# Patient Record
Sex: FEMALE | Race: WHITE | HISPANIC OR LATINO | Employment: UNEMPLOYED | URBAN - METROPOLITAN AREA
[De-identification: names, ages, dates, MRNs, and addresses within clinical notes are randomized per-mention and may not be internally consistent; named-entity substitution may affect disease eponyms.]

---
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
## 2020-01-03 ENCOUNTER — GYNECOLOGY VISIT (OUTPATIENT)
Dept: OBGYN | Facility: CLINIC | Age: 20
End: 2020-01-03
Payer: COMMERCIAL

## 2020-01-03 VITALS — WEIGHT: 179 LBS | DIASTOLIC BLOOD PRESSURE: 65 MMHG | SYSTOLIC BLOOD PRESSURE: 124 MMHG

## 2020-01-03 DIAGNOSIS — Z30.09 ENCOUNTER FOR COUNSELING REGARDING CONTRACEPTION: ICD-10-CM

## 2020-01-03 PROCEDURE — 99203 OFFICE O/P NEW LOW 30 MIN: CPT | Performed by: OBSTETRICS & GYNECOLOGY

## 2020-01-03 RX ORDER — FLUOXETINE HYDROCHLORIDE 20 MG/1
60 CAPSULE ORAL DAILY
COMMUNITY

## 2020-01-03 RX ORDER — DOXEPIN HYDROCHLORIDE 50 MG/1
50 CAPSULE ORAL NIGHTLY
COMMUNITY
End: 2020-05-16

## 2020-01-03 RX ORDER — MISOPROSTOL 200 UG/1
200 TABLET ORAL ONCE
Qty: 1 TAB | Refills: 0 | Status: SHIPPED | OUTPATIENT
Start: 2020-01-03 | End: 2020-01-03

## 2020-01-03 RX ORDER — CARBAMAZEPINE 200 MG/1
400 TABLET ORAL 3 TIMES DAILY
COMMUNITY

## 2020-01-03 NOTE — PROGRESS NOTES
Chief Complaint   Patient presents with   • Gynecologic Exam       History of present illness: 19 y.o.  presents with above chief complaint. Pt is interested in birth control, specifically an IUD.  She is currently using condoms only.  Birth control methods used in the past are condoms only. She had an attempted placement with WICC but was told she needs dilation and that they don't have dilators there.     PGYNHx: menarche 12yo , very irregular every 1-4 months, lasting 1-5 days,  Last pap never, no hx STDs. Sexually active with male partner    Psych history: diagnosed with MDD, social and generalized anxiety,     Review of systems:  Pertinent positives documented in HPI and all other systems reviewed & are negative    All PMH, PSH, allergies, social history and FH reviewed and updated today:  Past Medical History:   Diagnosis Date   • Anxiety    • Depression        History reviewed. No pertinent surgical history.    Allergies: Not on File    Social History     Socioeconomic History   • Marital status: Single     Spouse name: Not on file   • Number of children: Not on file   • Years of education: Not on file   • Highest education level: Not on file   Occupational History   • Not on file   Social Needs   • Financial resource strain: Not on file   • Food insecurity:     Worry: Not on file     Inability: Not on file   • Transportation needs:     Medical: Not on file     Non-medical: Not on file   Tobacco Use   • Smoking status: Never Smoker   • Smokeless tobacco: Never Used   Substance and Sexual Activity   • Alcohol use: Yes     Comment: socially    • Drug use: Yes     Frequency: 1.0 times per week     Types: Marijuana     Comment: once a week    • Sexual activity: Yes     Partners: Male     Birth control/protection: Condom   Lifestyle   • Physical activity:     Days per week: Not on file     Minutes per session: Not on file   • Stress: Not on file   Relationships   • Social connections:     Talks on phone:  Not on file     Gets together: Not on file     Attends [employer] service: Not on file     Active member of club or organization: Not on file     Attends meetings of clubs or organizations: Not on file     Relationship status: Not on file   • Intimate partner violence:     Fear of current or ex partner: Not on file     Emotionally abused: Not on file     Physically abused: Not on file     Forced sexual activity: Not on file   Other Topics Concern   • Not on file   Social History Narrative   • Not on file       Family History   Problem Relation Age of Onset   • Lupus Mother    • No Known Problems Father    • No Known Problems Brother        Physical exam:  /65   Wt 81.2 kg (179 lb)     GENERAL APPEARANCE: healthy, alert  NECK nontender, no masses, thyromegaly or nodules  NEURO Awake, alert and oriented x 3, Normal gait, no sensory deficits  SKIN No rashes, or ulcers or lesions seen  PSYCHIATRIC: Patient shows appropriate affect, is alert and oriented x3, intact judgment and insight.    Assessment/Plan:  1. Encounter for counseling regarding contraception       Counseling/coordination of care of birth control options including medical and surgical methods. Discussed in detail risk and benefits of OCP, NuvaRing, Patch, Depo, IUDs, Nexplanon as well as the normal side effects of each. Questions answered. Patient desires IUD for birth control and orders placed. Pt is to use condoms or pelvic rest until placement of product.  Advised she would be given cytotec 200mcg per vagina the night before the procedure.   Most recently had pap smear with WICC so will obtain records.

## 2020-01-03 NOTE — NON-PROVIDER
Pt here for BC consultation   Pt states has really irregular periods, had annual at [facility] on [address]. Had appointment to get IUD placed. IUD was not placed due to provider at [facility] not being able to insert.   Pt would like IUD, would like to discuss signing paperwork and going through with it.   Good# [phone]  Pharmacy verified

## 2020-03-25 ENCOUNTER — GYNECOLOGY VISIT (OUTPATIENT)
Dept: OBGYN | Facility: CLINIC | Age: 20
End: 2020-03-25
Payer: COMMERCIAL

## 2020-03-25 VITALS — WEIGHT: 166 LBS | DIASTOLIC BLOOD PRESSURE: 63 MMHG | SYSTOLIC BLOOD PRESSURE: 104 MMHG

## 2020-03-25 DIAGNOSIS — Z30.09 ENCOUNTER FOR COUNSELING REGARDING CONTRACEPTION: ICD-10-CM

## 2020-03-25 PROCEDURE — 58300 INSERT INTRAUTERINE DEVICE: CPT | Performed by: OBSTETRICS & GYNECOLOGY

## 2020-03-25 ASSESSMENT — FIBROSIS 4 INDEX: FIB4 SCORE: 0.26

## 2020-03-25 NOTE — PROCEDURES
IUD Insertion  Date/Time: 3/25/2020 9:33 AM  Performed by: Gabby Rendon D.O.  Authorized by: Gabby Rendon D.O.     Consent:     Consent obtained:  Verbal and written    Procedure risks and benefits discussed: yes      Patient questions answered: yes      Patient agrees, verbalizes understanding, and wants to proceed: yes      Instructions and paperwork completed: yes    Pre-procedure details:     Negative urine pregnancy test: yes    Procedure:     Pelvic exam performed: yes      Sterile speculum placed in vagina: yes      Cervix visualized: yes      Cervix cleaned and prepped in sterile fashion: yes      Tenaculum applied to cervix: yes      Dilation needed: no      Uterus sounded: yes      Uterus sound depth (cm):  7    IUD inserted with no complications: yes      IUD type:  Mirena    Strings trimmed: yes    Post-procedure:     Patient tolerated procedure well: yes      Patient will follow up after next period: yes

## 2020-05-04 ENCOUNTER — HOSPITAL ENCOUNTER (OUTPATIENT)
Dept: LAB | Facility: MEDICAL CENTER | Age: 20
End: 2020-05-04
Attending: NURSE PRACTITIONER
Payer: COMMERCIAL

## 2020-05-04 LAB
ALBUMIN SERPL BCP-MCNC: 4.8 G/DL (ref 3.2–4.9)
ALBUMIN/GLOB SERPL: 1.4 G/DL
ALP SERPL-CCNC: 91 U/L (ref 30–99)
ALT SERPL-CCNC: 11 U/L (ref 2–50)
ANION GAP SERPL CALC-SCNC: 14 MMOL/L (ref 7–16)
APPEARANCE UR: CLEAR
AST SERPL-CCNC: 12 U/L (ref 12–45)
BASOPHILS # BLD AUTO: 0.2 % (ref 0–1.8)
BASOPHILS # BLD: 0.01 K/UL (ref 0–0.12)
BILIRUB SERPL-MCNC: 0.4 MG/DL (ref 0.1–1.5)
BILIRUB UR QL STRIP.AUTO: NEGATIVE
BUN SERPL-MCNC: 10 MG/DL (ref 8–22)
CALCIUM SERPL-MCNC: 9.9 MG/DL (ref 8.5–10.5)
CHLORIDE SERPL-SCNC: 99 MMOL/L (ref 96–112)
CO2 SERPL-SCNC: 21 MMOL/L (ref 20–33)
COLOR UR: YELLOW
CREAT SERPL-MCNC: 0.53 MG/DL (ref 0.5–1.4)
EOSINOPHIL # BLD AUTO: 0.02 K/UL (ref 0–0.51)
EOSINOPHIL NFR BLD: 0.3 % (ref 0–6.9)
ERYTHROCYTE [DISTWIDTH] IN BLOOD BY AUTOMATED COUNT: 48.5 FL (ref 35.9–50)
FASTING STATUS PATIENT QL REPORTED: NORMAL
GLOBULIN SER CALC-MCNC: 3.4 G/DL (ref 1.9–3.5)
GLUCOSE SERPL-MCNC: 77 MG/DL (ref 65–99)
GLUCOSE UR STRIP.AUTO-MCNC: NEGATIVE MG/DL
HCT VFR BLD AUTO: 44.1 % (ref 37–47)
HGB BLD-MCNC: 14.5 G/DL (ref 12–16)
IMM GRANULOCYTES # BLD AUTO: 0.01 K/UL (ref 0–0.11)
IMM GRANULOCYTES NFR BLD AUTO: 0.2 % (ref 0–0.9)
KETONES UR STRIP.AUTO-MCNC: 15 MG/DL
LEUKOCYTE ESTERASE UR QL STRIP.AUTO: NEGATIVE
LYMPHOCYTES # BLD AUTO: 1.43 K/UL (ref 1–4.8)
LYMPHOCYTES NFR BLD: 23.7 % (ref 22–41)
MCH RBC QN AUTO: 30.8 PG (ref 27–33)
MCHC RBC AUTO-ENTMCNC: 32.9 G/DL (ref 33.6–35)
MCV RBC AUTO: 93.6 FL (ref 81.4–97.8)
MICRO URNS: ABNORMAL
MONOCYTES # BLD AUTO: 0.41 K/UL (ref 0–0.85)
MONOCYTES NFR BLD AUTO: 6.8 % (ref 0–13.4)
NEUTROPHILS # BLD AUTO: 4.15 K/UL (ref 2–7.15)
NEUTROPHILS NFR BLD: 68.8 % (ref 44–72)
NITRITE UR QL STRIP.AUTO: NEGATIVE
NRBC # BLD AUTO: 0 K/UL
NRBC BLD-RTO: 0 /100 WBC
PH UR STRIP.AUTO: 6 [PH] (ref 5–8)
PLATELET # BLD AUTO: 243 K/UL (ref 164–446)
PMV BLD AUTO: 10.4 FL (ref 9–12.9)
POTASSIUM SERPL-SCNC: 4.5 MMOL/L (ref 3.6–5.5)
PROT SERPL-MCNC: 8.2 G/DL (ref 6–8.2)
PROT UR QL STRIP: NEGATIVE MG/DL
RBC # BLD AUTO: 4.71 M/UL (ref 4.2–5.4)
RBC UR QL AUTO: NEGATIVE
SODIUM SERPL-SCNC: 134 MMOL/L (ref 135–145)
SP GR UR STRIP.AUTO: 1.03
TSH SERPL DL<=0.005 MIU/L-ACNC: 1.08 UIU/ML (ref 0.38–5.33)
UROBILINOGEN UR STRIP.AUTO-MCNC: 0.2 MG/DL
WBC # BLD AUTO: 6 K/UL (ref 4.8–10.8)

## 2020-05-04 PROCEDURE — 85025 COMPLETE CBC W/AUTO DIFF WBC: CPT

## 2020-05-04 PROCEDURE — 80053 COMPREHEN METABOLIC PANEL: CPT

## 2020-05-04 PROCEDURE — 84443 ASSAY THYROID STIM HORMONE: CPT

## 2020-05-04 PROCEDURE — 81003 URINALYSIS AUTO W/O SCOPE: CPT

## 2020-05-04 PROCEDURE — 36415 COLL VENOUS BLD VENIPUNCTURE: CPT

## 2020-05-16 ENCOUNTER — HOSPITAL ENCOUNTER (EMERGENCY)
Facility: MEDICAL CENTER | Age: 20
End: 2020-05-16
Attending: EMERGENCY MEDICINE
Payer: COMMERCIAL

## 2020-05-16 VITALS
WEIGHT: 161.6 LBS | TEMPERATURE: 98.4 F | DIASTOLIC BLOOD PRESSURE: 74 MMHG | BODY MASS INDEX: 28.63 KG/M2 | SYSTOLIC BLOOD PRESSURE: 108 MMHG | HEIGHT: 63 IN | RESPIRATION RATE: 16 BRPM | OXYGEN SATURATION: 98 % | HEART RATE: 88 BPM

## 2020-05-16 DIAGNOSIS — T50.902A INTENTIONAL DRUG OVERDOSE, INITIAL ENCOUNTER (HCC): ICD-10-CM

## 2020-05-16 DIAGNOSIS — R45.851 SUICIDAL IDEATION: ICD-10-CM

## 2020-05-16 LAB
APAP SERPL-MCNC: <5 UG/ML (ref 10–30)
CARBAMAZEPINE SERPL-MCNC: 10 UG/ML (ref 4–12)
CARBAMAZEPINE SERPL-MCNC: 8 UG/ML (ref 4–12)
EKG IMPRESSION: NORMAL
ETHANOL BLD-MCNC: <10.1 MG/DL (ref 0–10.1)
SALICYLATES SERPL-MCNC: <1 MG/DL (ref 15–25)

## 2020-05-16 PROCEDURE — 90471 IMMUNIZATION ADMIN: CPT

## 2020-05-16 PROCEDURE — 99285 EMERGENCY DEPT VISIT HI MDM: CPT

## 2020-05-16 PROCEDURE — 700111 HCHG RX REV CODE 636 W/ 250 OVERRIDE (IP): Performed by: EMERGENCY MEDICINE

## 2020-05-16 PROCEDURE — 99284 EMERGENCY DEPT VISIT MOD MDM: CPT | Performed by: PSYCHIATRY & NEUROLOGY

## 2020-05-16 PROCEDURE — 80156 ASSAY CARBAMAZEPINE TOTAL: CPT | Mod: 91

## 2020-05-16 PROCEDURE — 700105 HCHG RX REV CODE 258: Performed by: EMERGENCY MEDICINE

## 2020-05-16 PROCEDURE — 80307 DRUG TEST PRSMV CHEM ANLYZR: CPT

## 2020-05-16 PROCEDURE — 93005 ELECTROCARDIOGRAM TRACING: CPT | Performed by: EMERGENCY MEDICINE

## 2020-05-16 PROCEDURE — 90715 TDAP VACCINE 7 YRS/> IM: CPT | Performed by: EMERGENCY MEDICINE

## 2020-05-16 RX ORDER — TRAZODONE HYDROCHLORIDE 50 MG/1
25 TABLET ORAL NIGHTLY PRN
COMMUNITY

## 2020-05-16 RX ORDER — SODIUM CHLORIDE 9 MG/ML
1000 INJECTION, SOLUTION INTRAVENOUS ONCE
Status: COMPLETED | OUTPATIENT
Start: 2020-05-16 | End: 2020-05-16

## 2020-05-16 RX ADMIN — SODIUM CHLORIDE 1000 ML: 9 INJECTION, SOLUTION INTRAVENOUS at 03:15

## 2020-05-16 RX ADMIN — CLOSTRIDIUM TETANI TOXOID ANTIGEN (FORMALDEHYDE INACTIVATED), CORYNEBACTERIUM DIPHTHERIAE TOXOID ANTIGEN (FORMALDEHYDE INACTIVATED), BORDETELLA PERTUSSIS TOXOID ANTIGEN (GLUTARALDEHYDE INACTIVATED), BORDETELLA PERTUSSIS FILAMENTOUS HEMAGGLUTININ ANTIGEN (FORMALDEHYDE INACTIVATED), BORDETELLA PERTUSSIS PERTACTIN ANTIGEN, AND BORDETELLA PERTUSSIS FIMBRIAE 2/3 ANTIGEN 0.5 ML: 5; 2; 2.5; 5; 3; 5 INJECTION, SUSPENSION INTRAMUSCULAR at 10:26

## 2020-05-16 ASSESSMENT — FIBROSIS 4 INDEX: FIB4 SCORE: 0.28

## 2020-05-16 NOTE — ED NOTES
Pt alert and oriented after awoken up.  Denies needs at this time.  Does not need to urinate at this time but possibly soon.

## 2020-05-16 NOTE — PSYCHIATRY
"PSYCHIATRIC INTAKE EVALUATION    *Reason for admission: reports she was in her bedroom and felt an impulse to take 300mg of benadryl at around 0030. She reports that this was not an attempt to end her life and tried to make herself vomit with no success. She reports she is having auditory hallucinations. Pt is alert and awake at this time. She called poison control and was informed to come to the ED. She has also taken 400 mg tegratol at 0030.                 *Reason for consult:     overdose   *Requesting Physician/APN:  HERMINIA Coker MD         Legal Hold status:    +        *Chief Complaint:   \"I don't know why I did it\"    *HPI (includes Psychiatric ROS):  18 yo female who, on impulse, felt an \"urge\" to overdose. She is not aware of it being a SA and isn't SI now. She told her mom what she had done. She had some confusion thinking she was talking to a friend when she wasn't, yesterday, but these are gone today.     Pt had been at [facility] a few days for depression. She was \"scammed\" about 2 weeks ago and this brought on this episode of depression. Without trazodone she doesn't sleep, her appetite has been down, decreased energy, mild anhedonia, intermittent hopelessness. No SI.     Alexus: a few days here and there of increased energy with cleaning and working out, \"overly motivated\".     PTSD: none, denies trauma  Anxiety/Panic: provoked by situations.   Psychosis: denies except as noted.     Psychotherapy: face to face for 37 min in addition to E/M: discussed borderline dx, coping skills, need for further therapy, self worth, knowing oneself, emptiness and so on.       *Medical Review Of Symptoms (not dx conditions):   All 10 symptoms reviewed and are negative except for  -Musculoskeletal: generalized achyness but denies temp. Improved today.  -Psychiatric: depression    *Psychiatric Examination:   Vitals: Blood pressure 108/74, pulse 88, temperature 36.9 °C (98.4 °F), temperature source Temporal, resp. rate 16, height 1.6 " "m (5' 3\"), weight 73.3 kg (161 lb 9.6 oz), last menstrual period 05/15/2020, SpO2 98 %.  General Appearance: normal habitus, good eye contact, clean. Cooperative.  Abnormal Movements:none  Gait and Posture: lying in bed and moves freely  Speech:wnl  Thought Process: normal rate  Associations:linear  Abnormal or Psychotic Thoughts:none  Judgement and Insight:improved  Orientation:grossly intact  Recent and Remote Memory:grossly intact  Attention Span and Concentration:intact  Language:not tested  Fund of Knowledge:not tested  Mood and Affect: depressed but improved. Affect looks euthymic  SI/HI: denies       *PAST MEDICAL/PSYCH/FAMILY/SOCIAL(as reported by patient):       *medical hx:          Past Medical History:   Diagnosis Date   • Anxiety    • Depression      History reviewed. No pertinent surgical history.     *psychiatric hx:   SAs: overdosed on Ibuprofen years ago after and upset with a relationship. She also cuts when \"pent up\" because it makes the emotions go away.  Guns: not that she knows of  Hospitalizations: last year at [facility]: a broken relationship, SI, called hotline.   Med Hx:celexa, doxepin. Currently on prozac just increased to 80 mg, trazodone for sleep and tegretol.   Dx Hx: \"borderline traits, anxiety and depression\".   Other: no abuse. No OCD, no PTSD.    *family Psych hx:  Depression on both sides. No known hx of SAs. Drinking on both sides but \"not alcoholics\"     *social hx:  Alcohol: socially  Drugs: THC but began having what sounds like cyclic vomiting. Now only uses 1-2 times a week. Feels better on it.     *MEDICAL HX: labs, MARS, medications, etc were reviewed. Only those findings of potential interest to psychiatry are noted below:    *Current Medical issues:  see below      *Allergies:  No Known Allergies  *Current Medications:  No current facility-administered medications for this encounter.     Current Outpatient Medications:   •  traZODone (DESYREL) 50 MG Tab, Take 25 mg by mouth at " bedtime as needed for Sleep., Disp: , Rfl:   •  carBAMazepine (TEGRETOL) 200 MG Tab, Take 400 mg by mouth 3 times a day. 400 mg at night, 100 mg in the morning, Disp: , Rfl:   •  FLUoxetine (PROZAC) 20 MG Cap, Take 80 mg by mouth every day., Disp: , Rfl:   *EKG: personally reviewed QTc: 435  *Imaging: none     *Labs:  No results for input(s): WBC, RBC, HEMOGLOBIN, HEMATOCRIT, MCV, MCH, RDW, PLATELETCT, MPV, NEUTSPOLYS, LYMPHOCYTES, MONOCYTES, EOSINOPHILS, BASOPHILS, RBCMORPHOLO in the last 72 hours.  Lab Results   Component Value Date/Time    SODIUM 134 (L) 05/04/2020 12:56 PM    POTASSIUM 4.5 05/04/2020 12:56 PM    CHLORIDE 99 05/04/2020 12:56 PM    CO2 21 05/04/2020 12:56 PM    GLUCOSE 77 05/04/2020 12:56 PM    BUN 10 05/04/2020 12:56 PM    CREATININE 0.53 05/04/2020 12:56 PM     Lab Results   Component Value Date/Time    FREET4 0.98 01/13/2018 0854         *ASSESSMENT/PLAN:    1. Major depressive disorder mild to moderate without psychosis  -R/O Major depression mixed.  - continue prozac but at 60 mg  -add abilify 5 mg as adjunct: script  -new referrals to [facility] outpt: she has not had a clear response to prozac at lower doses so increasing it above recommended levels is not indicated and may have caused some anxiety/agitation. This was discussed with pt.          2.  Borderline personality disorder    -we discussed this dx in details and she admits she has feelings of abandonment, a lot of reactivity to the environment, etc.     3. Medical:  - s/p anticholingergic and tegretol overdose with mild delirium now resolved.       Legal hold: dc'd. Spoke with mom who will lock up all meds of any kind and give her the days medication to prevent impulsive behaviors which mom endorsed. Mom says she asked pt why and pt did not know.. hx is also consistent. She agrees with new outpt behavioral health and return home. Will be here in 2 hours.     *Signing off  *Thank you for the consult

## 2020-05-16 NOTE — DISCHARGE PLANNING
ALERT team  note:  19 year old female admitted this AM, legal hold, self-harm; [facility] insurance plan; pt evaluated by [facility] ED psychiatrist, Dr. Keane,legal hold DC's; pt denies SI, HI, or self-harm ideation; pt with h/o engaging in self-cutting behaviors for 9 years with last episode last night; writer RN reviewed Cognitive Behavior Therapy (CBT)  with pt and gave pt several CBT practice worksheets, including thought record, mood record, activity list, feelings log to practice at home; writer RN reviewed community  resources with pt, with written information given, including [facility], [facility], [facility]; writer RN to send pt referral to [facility]; pt verbalized understanding; pt to DC to self today to home

## 2020-05-16 NOTE — ED NOTES
"Pt denies suicidal intentions with taking the benadryl. Pt states that she \"got an overwhelming urge that she needed to take them but didn't feel suicidal.\" Pt states that she is self cutter and already \"self harmed for the day.\" Pt showed this RN multiple superficial cuts to the bilateral anterior thighs. Pt states that she is having auditory hallucinations at this time and states that this is new as of tonight.   "

## 2020-05-16 NOTE — ED PROVIDER NOTES
ED Provider Note    Scribed for Ashok Coker M.D. by Krystin Brody. 5/16/2020  2:56 AM    Primary care provider: CELINE Arriola  Means of arrival: Walk-In  History obtained from: Patient/Nurse  History limited by: None    CHIEF COMPLAINT  Chief Complaint   Patient presents with   • Drug Overdose     pt reports she was in her bedroom and felt an impulse to take 300mg of benadryl at around 0030. She reports that this was not an attempt to end her life and tried to make herself vomit with no success. She reports she is having auditory hallucinations. Pt is alert and awake at this time. She called poison control and was informed to come to the ED. She has also taken 400 mg tegratol at 0030.       HPI  Taylor Patterson is a 19 y.o. female, with a history of anxiety and depression, who presents to the Emergency Department for drug overdose that occurred at approximately 12:30 AM this morning. Per triage note, patient was in her bedroom when she had the sudden urge her to take Benadryl. She denies any suicidal ideations and notes she was not trying to hurt herself. She reportedly took 300 mg of Benadryl and denies taking more than the recommended dose of tegretol. She additionally reports auditory hallucinations after taking the Benadryl. She denies any alcohol or drug use in the last couple of hours. She states she was treated at [facility] almost a year ago and was recently treated at [facility] and released on May 9th.     REVIEW OF SYSTEMS  Pertinent positives include: drug overdose and auditory hallucinations. Pertinent negatives include: suicidal ideations. See history of present illness. All other systems are negative.     PAST MEDICAL HISTORY   has a past medical history of Anxiety and Depression.    SURGICAL HISTORY  patient denies any surgical history    SOCIAL HISTORY  Social History     Tobacco Use   • Smoking status: Never Smoker   • Smokeless tobacco: Never Used  "  Substance Use Topics   • Alcohol use: Yes     Comment: socially    • Drug use: Yes     Frequency: 1.0 times per week     Types: Marijuana     Comment: once a week       Social History     Substance and Sexual Activity   Drug Use Yes   • Frequency: 1.0 times per week   • Types: Marijuana    Comment: once a week        FAMILY HISTORY  Family History   Problem Relation Age of Onset   • Lupus Mother    • No Known Problems Father    • No Known Problems Brother        CURRENT MEDICATIONS  Home Medications     Reviewed by Katie Zeng R.N. (Registered Nurse) on 05/16/20 at 0152  Med List Status: Complete   Medication Last Dose Status   carBAMazepine (TEGRETOL) 200 MG Tab 5/16/2020 Active   FLUoxetine (PROZAC) 20 MG Cap 5/15/2020 Active   traZODone (DESYREL) 50 MG Tab  Active                ALLERGIES  No Known Allergies    PHYSICAL EXAM  VITAL SIGNS: /88   Pulse 87   Temp 36.4 °C (97.6 °F)   Resp 16   Ht 1.6 m (5' 3\")   Wt 73.3 kg (161 lb 9.6 oz)   LMP 05/15/2020 (Exact Date) Comment: IUD  SpO2 98%   BMI 28.63 kg/m²     Constitutional: Alert in no apparent distress.  HENT: No signs of trauma, Bilateral external ears normal, Nose normal. Uvula midline.   Eyes: Pupils are equal and reactive, Conjunctiva normal, Non-icteric.   Neck: Normal range of motion, No tenderness, Supple, No stridor.   Lymphatic: No lymphadenopathy noted.   Cardiovascular: Regular rate and rhythm, no murmurs.   Thorax & Lungs: Normal breath sounds, No respiratory distress, No wheezing, No chest tenderness.   Abdomen:  Soft, No tenderness, No peritoneal signs, No masses, No pulsatile masses.   Skin: Warm, Dry, No erythema, No rash. Superficial laceration to bilateral lower extremities.   Back: No bony tenderness, No CVA tenderness.   Extremities: Intact distal pulses, No edema, No tenderness, No cyanosis.  Musculoskeletal: Good range of motion in all major joints. No tenderness to palpation or major deformities noted. No clonus. "   Neurologic: Alert , Normal motor function, Normal sensory function, No focal deficits noted.   Psychiatric: Affect normal, Judgment normal, Mood normal.     I verified that the patient was wearing a mask and I was wearing appropriate PPE every time I entered the room. The patient's mask was on the patient at all times during my encounter except for a brief view of the oropharynx.     DIAGNOSTIC STUDIES / PROCEDURES    LABS  Labs Reviewed   SALICYLATE - Abnormal; Notable for the following components:       Result Value    Salicylates, Quant. <1 (*)     All other components within normal limits   ACETAMINOPHEN - Abnormal; Notable for the following components:    Acetaminophen -Tylenol <5 (*)     All other components within normal limits   DIAGNOSTIC ALCOHOL   CARBAMAZEPINE   URINE DRUG SCREEN   CARBAMAZEPINE      All labs reviewed by me.      RADIOLOGY  No orders to display     The radiologist's interpretation of all radiological studies have been reviewed by me.    COURSE & MEDICAL DECISION MAKING  Nursing notes, VSFELIPEx reviewed in chart.    19 y.o. female p/w chief complaint of drug overdose.    2:56 AM Patient seen and examined at bedside.      The differential diagnoses include but are not limited to:   #Drug overdose   [place] poison control center contacted and the case number is [id number].  Recommended obtaining an initial Tegretol level and repeat Tegretol level pending at 7:30 AM.  Intravenous fluids administered for drug overdose.  Patient not appropriate for oral rehydration, as surgical process needs to be ruled out before trial of oral rehydration.   On repeat evaluation, pt w/ improvement in sx.  Pt w/ positive fluid response.    Ms. Varun Patterson was here with a confirmed overdose of benadryl  No obvious QRS widening and patient monitored on cardiac monitor during duration of stay in emergency department.  Patient with nonsuturable bilateral superficial lacerations to lower extremities.  Despite  ingestion of Benadryl patient easily arousable in emergency department.  Patient with no seizure-like activity while in emergency department.  Patient monitored closely while in emergency department to ensure no respiratory depression occurred.  Patient did not develop acute anticholinergic toxidrome while in emergency department.     Patient CARE signed out to Dr. Evans pending repeat Tegretol level and if not elevated plan for patient to be transferred to psychiatric center.  If significantly elevated from prior would consider re-contacting Poison Control Center for recommendation.    FINAL IMPRESSION  1. Intentional drug overdose, initial encounter ([facility])    2. Suicidal ideation          Krystin DICKENS (Konrad), am scribing for, and in the presence of, Ashok Coker M.D..    Electronically signed by: Krystin Brody (Konrad), 5/16/2020    Ashok DICKENS M.D. personally performed the services described in this documentation, as scribed by Krystin Brody in my presence, and it is both accurate and complete.    The note accurately reflects work and decisions made by me.  Ashok Coker M.D.  5/16/2020  6:41 AM

## 2020-05-16 NOTE — ED NOTES
Spoke to Julia at [place] poison control and the case number is [id number]. MD updated with poison control recommendations.

## 2020-05-16 NOTE — ED PROVIDER NOTES
ED Provider Note    The patient was signed out to my care pending repeat Tegretol level that is declining as well as evaluation by psychiatry.  Psychiatrist has evaluated the patient.  She feels this was not truly an intentional overdose.  She states that the patient has good support and the patient does contract for safety.  Therefore she recommends discharge with further outpatient therapy and they will arrange outpatient management with changing of her medication.  The patient contracts for safety and will be discharged.

## 2020-07-21 ENCOUNTER — GYNECOLOGY VISIT (OUTPATIENT)
Dept: OBGYN | Facility: CLINIC | Age: 20
End: 2020-07-21
Payer: COMMERCIAL

## 2020-07-21 VITALS — BODY MASS INDEX: 28.87 KG/M2 | DIASTOLIC BLOOD PRESSURE: 64 MMHG | WEIGHT: 163 LBS | SYSTOLIC BLOOD PRESSURE: 135 MMHG

## 2020-07-21 DIAGNOSIS — Z30.431 IUD CHECK UP: ICD-10-CM

## 2020-07-21 PROCEDURE — 99213 OFFICE O/P EST LOW 20 MIN: CPT | Performed by: OBSTETRICS & GYNECOLOGY

## 2020-07-21 ASSESSMENT — FIBROSIS 4 INDEX: FIB4 SCORE: 0.3

## 2020-07-21 NOTE — NON-PROVIDER
Patient here today for IUD string check.  Has Mirena, placed on 03/25/2020  C/o has some discomfort and partner has some discomfort.  Phone # [phone]  Pharmacy verified.

## 2020-07-21 NOTE — PROGRESS NOTES
Subjective:      Taylor Patterson is a 20 y.o. female who presents with Gynecologic Exam (IUD string check)            HPI patient is a 20-year-old  0 who presents today for IUD surveillance.  Had Mirena IUD placed in 2020.  She is doing well except for complaints of her partner being poked by IUD string and requested IUD strings to be clipped shorter.  She otherwise has no complaints or concerns.  She reports light monthly menstrual bleeding without any pelvic pain symptoms.  Reports no abnormal discharge.  She reports normal bowel and bladder functions    ROS all organ systems are reviewed and were negative except for complaints in HPI       Objective:     /64 (BP Location: Right arm, Patient Position: Sitting)   Wt 73.9 kg (163 lb)   LMP 2020 (Exact Date)   Breastfeeding No   BMI 28.87 kg/m²      Physical Exam  Vitals signs and nursing note reviewed. Exam conducted with a chaperone present.   Constitutional:       General: She is not in acute distress.     Appearance: Normal appearance.   HENT:      Head: Normocephalic and atraumatic.   Neck:      Musculoskeletal: Normal range of motion and neck supple. No neck rigidity.   Cardiovascular:      Rate and Rhythm: Normal rate and regular rhythm.      Pulses: Normal pulses.      Heart sounds: Normal heart sounds. No murmur.   Pulmonary:      Effort: Pulmonary effort is normal. No respiratory distress.      Breath sounds: Normal breath sounds. No wheezing.   Abdominal:      General: Abdomen is flat. Bowel sounds are normal. There is no distension.      Palpations: Abdomen is soft.   Genitourinary:     Labia:         Right: No rash, tenderness, lesion or injury.         Left: No rash, tenderness, lesion or injury.       Vagina: No vaginal discharge, tenderness or bleeding.      Cervix: No cervical motion tenderness, discharge, friability, lesion or erythema.      Uterus: Not enlarged and not tender.       Adnexa:       Right: No mass, tenderness or fullness.          Left: No mass, tenderness or fullness.           Musculoskeletal: Normal range of motion.      Right lower leg: No edema.      Left lower leg: No edema.   Lymphadenopathy:      Cervical: No cervical adenopathy.      Lower Body: No right inguinal adenopathy. No left inguinal adenopathy.   Skin:     General: Skin is warm and dry.      Coloration: Skin is not jaundiced.      Findings: No bruising.   Neurological:      General: No focal deficit present.      Mental Status: She is alert and oriented to person, place, and time.      Cranial Nerves: No cranial nerve deficit.      Motor: No weakness.   Psychiatric:         Mood and Affect: Mood normal.         Behavior: Behavior normal.         Thought Content: Thought content normal.         Judgment: Judgment normal.                 Assessment/Plan:       1. IUD check up  Patient presents today for IUD surveillance.  IUD is in place  Patient requested IUD strings to be shortened-IUD strings were clipped  Safe sex practices discussed.  Backup contraception reviewed  IUD string checks were discussed    2.  Precautions and plan of care reviewed.  Patient to follow-up for annual gynecologic exam and as needed for any gynecologic problems.

## 2021-06-10 ENCOUNTER — HOSPITAL ENCOUNTER (EMERGENCY)
Facility: MEDICAL CENTER | Age: 21
End: 2021-06-10
Attending: EMERGENCY MEDICINE
Payer: COMMERCIAL

## 2021-06-10 VITALS
HEIGHT: 63 IN | RESPIRATION RATE: 16 BRPM | DIASTOLIC BLOOD PRESSURE: 62 MMHG | HEART RATE: 73 BPM | OXYGEN SATURATION: 99 % | TEMPERATURE: 97.8 F | BODY MASS INDEX: 35.23 KG/M2 | WEIGHT: 198.85 LBS | SYSTOLIC BLOOD PRESSURE: 113 MMHG

## 2021-06-10 DIAGNOSIS — T74.21XA SEXUAL ASSAULT OF ADULT, INITIAL ENCOUNTER: ICD-10-CM

## 2021-06-10 PROCEDURE — 700101 HCHG RX REV CODE 250: Performed by: EMERGENCY MEDICINE

## 2021-06-10 PROCEDURE — A9270 NON-COVERED ITEM OR SERVICE: HCPCS | Performed by: EMERGENCY MEDICINE

## 2021-06-10 PROCEDURE — 700111 HCHG RX REV CODE 636 W/ 250 OVERRIDE (IP): Performed by: EMERGENCY MEDICINE

## 2021-06-10 PROCEDURE — 96372 THER/PROPH/DIAG INJ SC/IM: CPT

## 2021-06-10 PROCEDURE — 700102 HCHG RX REV CODE 250 W/ 637 OVERRIDE(OP): Performed by: EMERGENCY MEDICINE

## 2021-06-10 PROCEDURE — 99284 EMERGENCY DEPT VISIT MOD MDM: CPT

## 2021-06-10 RX ORDER — AZITHROMYCIN 500 MG/1
1000 TABLET, FILM COATED ORAL ONCE
Qty: 2 TABLET | Refills: 0 | Status: SHIPPED | OUTPATIENT
Start: 2021-06-10 | End: 2021-06-10

## 2021-06-10 RX ORDER — CEFTRIAXONE 500 MG/1
500 INJECTION, POWDER, FOR SOLUTION INTRAMUSCULAR; INTRAVENOUS ONCE
Status: COMPLETED | OUTPATIENT
Start: 2021-06-10 | End: 2021-06-10

## 2021-06-10 RX ORDER — AZITHROMYCIN 250 MG/1
1000 TABLET, FILM COATED ORAL ONCE
Status: COMPLETED | OUTPATIENT
Start: 2021-06-10 | End: 2021-06-10

## 2021-06-10 RX ADMIN — PENICILLIN G BENZATHINE 2.4 MILLION UNITS: 1200000 INJECTION, SUSPENSION INTRAMUSCULAR at 13:24

## 2021-06-10 RX ADMIN — AZITHROMYCIN MONOHYDRATE 1000 MG: 250 TABLET ORAL at 13:09

## 2021-06-10 RX ADMIN — LIDOCAINE HYDROCHLORIDE 1 ML: 10 INJECTION, SOLUTION INFILTRATION; PERINEURAL at 13:12

## 2021-06-10 RX ADMIN — CEFTRIAXONE SODIUM 500 MG: 500 INJECTION, POWDER, FOR SOLUTION INTRAMUSCULAR; INTRAVENOUS at 13:11

## 2021-06-10 ASSESSMENT — FIBROSIS 4 INDEX: FIB4 SCORE: 0.31
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
## 2021-06-10 NOTE — ED NOTES
Patient verbalized understanding of discharge instructions, no questions at this time. VS stable, patient will ambulate to exit with d/c instructions and rx in hand. Pt reports she feels she has all necessary resources and is aware of who to contact if she needs anymore help or has any more questions.
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
## 2021-07-20 ENCOUNTER — HOSPITAL ENCOUNTER (OUTPATIENT)
Dept: LAB | Facility: MEDICAL CENTER | Age: 21
End: 2021-07-20
Attending: PHYSICIAN ASSISTANT
Payer: COMMERCIAL

## 2021-07-20 LAB
C TRACH DNA SPEC QL NAA+PROBE: NEGATIVE
HCV AB SER QL: NORMAL
HIV 1+2 AB+HIV1 P24 AG SERPL QL IA: NORMAL
N GONORRHOEA DNA SPEC QL NAA+PROBE: NEGATIVE
SPECIMEN SOURCE: NORMAL
TREPONEMA PALLIDUM IGG+IGM AB [PRESENCE] IN SERUM OR PLASMA BY IMMUNOASSAY: NORMAL

## 2021-07-20 PROCEDURE — 87389 HIV-1 AG W/HIV-1&-2 AB AG IA: CPT

## 2021-07-20 PROCEDURE — 86803 HEPATITIS C AB TEST: CPT

## 2021-07-20 PROCEDURE — 86694 HERPES SIMPLEX NES ANTBDY: CPT

## 2021-07-20 PROCEDURE — 86780 TREPONEMA PALLIDUM: CPT

## 2021-07-20 PROCEDURE — 87591 N.GONORRHOEAE DNA AMP PROB: CPT

## 2021-07-20 PROCEDURE — 87491 CHLMYD TRACH DNA AMP PROBE: CPT

## 2021-07-20 PROCEDURE — 36415 COLL VENOUS BLD VENIPUNCTURE: CPT

## 2021-07-22 LAB — HSV1+2 IGG SER IA-ACNC: 0.69 IV

## 2022-05-05 ENCOUNTER — OFFICE VISIT (OUTPATIENT)
Dept: URGENT CARE | Facility: PHYSICIAN GROUP | Age: 22
End: 2022-05-05
Payer: COMMERCIAL

## 2022-05-05 VITALS
HEIGHT: 63 IN | HEART RATE: 83 BPM | OXYGEN SATURATION: 95 % | DIASTOLIC BLOOD PRESSURE: 60 MMHG | TEMPERATURE: 97.4 F | BODY MASS INDEX: 35.44 KG/M2 | SYSTOLIC BLOOD PRESSURE: 110 MMHG | RESPIRATION RATE: 16 BRPM | WEIGHT: 200 LBS

## 2022-05-05 DIAGNOSIS — R30.0 DYSURIA: ICD-10-CM

## 2022-05-05 LAB
APPEARANCE UR: CLEAR
BILIRUB UR STRIP-MCNC: NEGATIVE MG/DL
COLOR UR AUTO: YELLOW
GLUCOSE UR STRIP.AUTO-MCNC: NEGATIVE MG/DL
KETONES UR STRIP.AUTO-MCNC: NEGATIVE MG/DL
LEUKOCYTE ESTERASE UR QL STRIP.AUTO: NORMAL
NITRITE UR QL STRIP.AUTO: NEGATIVE
PH UR STRIP.AUTO: 7 [PH] (ref 5–8)
PROT UR QL STRIP: NEGATIVE MG/DL
RBC UR QL AUTO: NORMAL
SP GR UR STRIP.AUTO: 1.02
UROBILINOGEN UR STRIP-MCNC: 0.2 MG/DL

## 2022-05-05 PROCEDURE — 81002 URINALYSIS NONAUTO W/O SCOPE: CPT | Performed by: PHYSICIAN ASSISTANT

## 2022-05-05 PROCEDURE — 99203 OFFICE O/P NEW LOW 30 MIN: CPT | Performed by: PHYSICIAN ASSISTANT

## 2022-05-05 RX ORDER — PHENAZOPYRIDINE HYDROCHLORIDE 200 MG/1
200 TABLET, FILM COATED ORAL 3 TIMES DAILY
Qty: 6 TABLET | Refills: 0 | Status: SHIPPED | OUTPATIENT
Start: 2022-05-05 | End: 2022-05-07

## 2022-05-05 RX ORDER — ARIPIPRAZOLE 10 MG/1
TABLET ORAL
COMMUNITY
Start: 2022-03-18

## 2022-05-05 RX ORDER — NITROFURANTOIN 25; 75 MG/1; MG/1
100 CAPSULE ORAL EVERY 12 HOURS
Qty: 10 CAPSULE | Refills: 0 | Status: SHIPPED | OUTPATIENT
Start: 2022-05-05 | End: 2022-05-10

## 2022-05-05 ASSESSMENT — ENCOUNTER SYMPTOMS
DIARRHEA: 0
HEADACHES: 0
MYALGIAS: 0
FLANK PAIN: 0
DIZZINESS: 0
ABDOMINAL PAIN: 0
CHILLS: 0
FEVER: 0

## 2022-06-08 ENCOUNTER — HOSPITAL ENCOUNTER (OUTPATIENT)
Facility: MEDICAL CENTER | Age: 22
End: 2022-06-08
Attending: FAMILY MEDICINE
Payer: COMMERCIAL

## 2022-06-08 ENCOUNTER — OFFICE VISIT (OUTPATIENT)
Dept: URGENT CARE | Facility: PHYSICIAN GROUP | Age: 22
End: 2022-06-08
Payer: COMMERCIAL

## 2022-06-08 VITALS
SYSTOLIC BLOOD PRESSURE: 102 MMHG | OXYGEN SATURATION: 99 % | HEIGHT: 63 IN | DIASTOLIC BLOOD PRESSURE: 60 MMHG | HEART RATE: 66 BPM | TEMPERATURE: 98.2 F | WEIGHT: 196 LBS | BODY MASS INDEX: 34.73 KG/M2 | RESPIRATION RATE: 16 BRPM

## 2022-06-08 DIAGNOSIS — N89.8 VAGINAL ODOR: ICD-10-CM

## 2022-06-08 DIAGNOSIS — Z11.3 ROUTINE SCREENING FOR STI (SEXUALLY TRANSMITTED INFECTION): ICD-10-CM

## 2022-06-08 DIAGNOSIS — J03.90 TONSILLITIS: ICD-10-CM

## 2022-06-08 DIAGNOSIS — N76.0 BV (BACTERIAL VAGINOSIS): ICD-10-CM

## 2022-06-08 DIAGNOSIS — B96.89 BV (BACTERIAL VAGINOSIS): ICD-10-CM

## 2022-06-08 DIAGNOSIS — R59.1 LYMPHADENOPATHY: ICD-10-CM

## 2022-06-08 LAB
C TRACH DNA GENITAL QL NAA+PROBE: NEGATIVE
CANDIDA DNA VAG QL PROBE+SIG AMP: NEGATIVE
G VAGINALIS DNA VAG QL PROBE+SIG AMP: POSITIVE
HETEROPH AB SER QL LA: NEGATIVE
INT CON NEG: NORMAL
INT CON NEG: NORMAL
INT CON POS: NORMAL
INT CON POS: NORMAL
N GONORRHOEA DNA GENITAL QL NAA+PROBE: NEGATIVE
S PYO AG THROAT QL: NEGATIVE
SPECIMEN SOURCE: NORMAL
T VAGINALIS DNA VAG QL PROBE+SIG AMP: NEGATIVE

## 2022-06-08 PROCEDURE — 87880 STREP A ASSAY W/OPTIC: CPT | Performed by: FAMILY MEDICINE

## 2022-06-08 PROCEDURE — 87510 GARDNER VAG DNA DIR PROBE: CPT

## 2022-06-08 PROCEDURE — 87660 TRICHOMONAS VAGIN DIR PROBE: CPT

## 2022-06-08 PROCEDURE — 87480 CANDIDA DNA DIR PROBE: CPT

## 2022-06-08 PROCEDURE — 87491 CHLMYD TRACH DNA AMP PROBE: CPT

## 2022-06-08 PROCEDURE — 99214 OFFICE O/P EST MOD 30 MIN: CPT | Performed by: FAMILY MEDICINE

## 2022-06-08 PROCEDURE — 86308 HETEROPHILE ANTIBODY SCREEN: CPT | Performed by: FAMILY MEDICINE

## 2022-06-08 PROCEDURE — 87591 N.GONORRHOEAE DNA AMP PROB: CPT

## 2022-06-08 RX ORDER — FAMOTIDINE 20 MG/1
TABLET, FILM COATED ORAL
COMMUNITY
Start: 2022-06-07

## 2022-06-08 RX ORDER — LAMOTRIGINE 150 MG/1
TABLET ORAL
COMMUNITY
Start: 2022-06-07

## 2022-06-08 RX ORDER — BUSPIRONE HYDROCHLORIDE 30 MG/1
TABLET ORAL
COMMUNITY
Start: 2022-06-07

## 2022-06-08 RX ORDER — LIDOCAINE HYDROCHLORIDE 20 MG/ML
15 SOLUTION OROPHARYNGEAL EVERY 4 HOURS PRN
Qty: 120 ML | Refills: 0 | Status: SHIPPED | OUTPATIENT
Start: 2022-06-08

## 2022-06-08 ASSESSMENT — ENCOUNTER SYMPTOMS
EYE DISCHARGE: 0
EYE REDNESS: 0
WEIGHT LOSS: 0
VOMITING: 0
NAUSEA: 0
MYALGIAS: 0

## 2022-06-08 NOTE — PROGRESS NOTES
"Subjective     Taylor Patterson is a 22 y.o. female who presents with Yeast Infection (Jaw pain bilaterally x1 week, foul odor, poss yeast infection x5 days)            1 week ST, swollen glands. Fatigue. No fever. Mostly dry cough. No SOB/wheeze. No PMH asthma/pneumonia. Tolerating fluids with normal urine output.     5 days change vaginal odor. No itching. No discharge. PMH BV and suspects the same. No dysuria. Does not suspect pregnancy. Requests STI screening.         Review of Systems   Constitutional: Negative for malaise/fatigue and weight loss.   Eyes: Negative for discharge and redness.   Gastrointestinal: Negative for nausea and vomiting.   Musculoskeletal: Negative for joint pain and myalgias.   Skin: Negative for itching and rash.              Objective     /60 (BP Location: Left arm, Patient Position: Sitting, BP Cuff Size: Adult)   Pulse 66   Temp 36.8 °C (98.2 °F) (Temporal)   Resp 16   Ht 1.6 m (5' 3\")   Wt 88.9 kg (196 lb)   SpO2 99%   BMI 34.72 kg/m²      Physical Exam  Constitutional:       General: She is not in acute distress.     Appearance: She is well-developed.   HENT:      Head: Normocephalic and atraumatic.      Right Ear: Tympanic membrane normal.      Left Ear: Tympanic membrane normal.      Nose: Congestion present.      Mouth/Throat:      Mouth: Mucous membranes are moist.      Comments: 2+ red tonsils with purulent exudate. No evidence of abscess.    Eyes:      Conjunctiva/sclera: Conjunctivae normal.   Cardiovascular:      Rate and Rhythm: Normal rate and regular rhythm.      Heart sounds: Normal heart sounds. No murmur heard.  Pulmonary:      Effort: Pulmonary effort is normal.      Breath sounds: Normal breath sounds. No wheezing.   Genitourinary:     Comments: deferred  Musculoskeletal:      Cervical back: Neck supple.   Lymphadenopathy:      Cervical: Cervical adenopathy present.   Skin:     General: Skin is warm and dry.      Findings: No rash. "   Neurological:      Mental Status: She is alert and oriented to person, place, and time.                             Assessment & Plan     poct strep negative  poct mono negative    1. Tonsillitis  POCT Rapid Strep A    POCT Mononucleosis (mono)    lidocaine (XYLOCAINE) 2 % Solution   2. Lymphadenopathy  POCT Rapid Strep A    POCT Mononucleosis (mono)   3. Routine screening for STI (sexually transmitted infection)  VAGINAL PATHOGENS DNA PANEL    HIV AG/AB COMBO ASSAY SCREENING    T.PALLIDUM AB EIA    Chlamydia/GC, PCR (Genital/Anal swab)   4. Vaginal odor  VAGINAL PATHOGENS DNA PANEL     Differential diagnosis, natural history, supportive care, and indications for immediate follow-up discussed at length.     F/u studies.

## 2022-06-08 NOTE — LETTER
June 8, 2022         Patient: Taylor Patterson   YOB: 2000   Date of Visit: 6/8/2022           To Whom it May Concern:    Taylor Patterson was seen in my clinic on 6/8/2022. Please excuse from work today. She may return tomorrow.       Sincerely,           Pancho Swift M.D.  Electronically Signed

## 2022-06-11 RX ORDER — METRONIDAZOLE 500 MG/1
500 TABLET ORAL EVERY 12 HOURS
Qty: 14 TABLET | Refills: 0 | Status: SHIPPED | OUTPATIENT
Start: 2022-06-11 | End: 2022-06-18

## 2023-01-17 ENCOUNTER — HOSPITAL ENCOUNTER (OUTPATIENT)
Dept: RADIOLOGY | Facility: MEDICAL CENTER | Age: 23
End: 2023-01-17
Attending: STUDENT IN AN ORGANIZED HEALTH CARE EDUCATION/TRAINING PROGRAM
Payer: COMMERCIAL

## 2023-01-17 DIAGNOSIS — K43.9 VENTRAL HERNIA WITHOUT OBSTRUCTION OR GANGRENE: ICD-10-CM

## 2023-01-17 PROCEDURE — 76705 ECHO EXAM OF ABDOMEN: CPT

## 2023-05-20 ENCOUNTER — OFFICE VISIT (OUTPATIENT)
Dept: URGENT CARE | Facility: PHYSICIAN GROUP | Age: 23
End: 2023-05-20
Payer: COMMERCIAL

## 2023-05-20 ENCOUNTER — HOSPITAL ENCOUNTER (OUTPATIENT)
Facility: MEDICAL CENTER | Age: 23
End: 2023-05-20
Payer: COMMERCIAL

## 2023-05-20 VITALS
SYSTOLIC BLOOD PRESSURE: 110 MMHG | OXYGEN SATURATION: 98 % | RESPIRATION RATE: 12 BRPM | HEIGHT: 63 IN | DIASTOLIC BLOOD PRESSURE: 70 MMHG | WEIGHT: 180 LBS | HEART RATE: 80 BPM | BODY MASS INDEX: 31.89 KG/M2 | TEMPERATURE: 98.2 F

## 2023-05-20 DIAGNOSIS — N89.8 VAGINAL ODOR: ICD-10-CM

## 2023-05-20 DIAGNOSIS — Z11.3 SCREENING EXAMINATION FOR STD (SEXUALLY TRANSMITTED DISEASE): ICD-10-CM

## 2023-05-20 LAB
APPEARANCE UR: NORMAL
BILIRUB UR STRIP-MCNC: NEGATIVE MG/DL
CANDIDA DNA VAG QL PROBE+SIG AMP: NEGATIVE
COLOR UR AUTO: NORMAL
G VAGINALIS DNA VAG QL PROBE+SIG AMP: POSITIVE
GLUCOSE UR STRIP.AUTO-MCNC: NEGATIVE MG/DL
KETONES UR STRIP.AUTO-MCNC: NEGATIVE MG/DL
LEUKOCYTE ESTERASE UR QL STRIP.AUTO: NEGATIVE
NITRITE UR QL STRIP.AUTO: NEGATIVE
PH UR STRIP.AUTO: 5.5 [PH] (ref 5–8)
POCT INT CON NEG: NEGATIVE
POCT INT CON POS: POSITIVE
POCT URINE PREGNANCY TEST: NEGATIVE
PROT UR QL STRIP: NEGATIVE MG/DL
RBC UR QL AUTO: NEGATIVE
SP GR UR STRIP.AUTO: 1.03
T VAGINALIS DNA VAG QL PROBE+SIG AMP: NEGATIVE
UROBILINOGEN UR STRIP-MCNC: 0.2 MG/DL

## 2023-05-20 PROCEDURE — 3078F DIAST BP <80 MM HG: CPT

## 2023-05-20 PROCEDURE — 87510 GARDNER VAG DNA DIR PROBE: CPT

## 2023-05-20 PROCEDURE — 87660 TRICHOMONAS VAGIN DIR PROBE: CPT

## 2023-05-20 PROCEDURE — 81002 URINALYSIS NONAUTO W/O SCOPE: CPT

## 2023-05-20 PROCEDURE — 3074F SYST BP LT 130 MM HG: CPT

## 2023-05-20 PROCEDURE — 99213 OFFICE O/P EST LOW 20 MIN: CPT

## 2023-05-20 PROCEDURE — 87491 CHLMYD TRACH DNA AMP PROBE: CPT

## 2023-05-20 PROCEDURE — 87591 N.GONORRHOEAE DNA AMP PROB: CPT

## 2023-05-20 PROCEDURE — 87480 CANDIDA DNA DIR PROBE: CPT

## 2023-05-20 PROCEDURE — 81025 URINE PREGNANCY TEST: CPT

## 2023-05-20 RX ORDER — METRONIDAZOLE 500 MG/1
500 TABLET ORAL 2 TIMES DAILY
Qty: 14 TABLET | Refills: 0 | Status: SHIPPED | OUTPATIENT
Start: 2023-05-20 | End: 2023-05-27

## 2023-05-20 RX ORDER — DOXYCYCLINE HYCLATE 100 MG
100 TABLET ORAL 2 TIMES DAILY
Qty: 14 TABLET | Refills: 0 | Status: SHIPPED | OUTPATIENT
Start: 2023-05-20 | End: 2023-05-27

## 2023-05-20 RX ORDER — VENLAFAXINE 37.5 MG/1
37.5 TABLET ORAL DAILY
COMMUNITY
Start: 2023-02-28

## 2023-05-20 ASSESSMENT — ENCOUNTER SYMPTOMS
FEVER: 0
SHORTNESS OF BREATH: 0
MYALGIAS: 0

## 2023-05-20 NOTE — PROGRESS NOTES
Subjective:     CHIEF COMPLAINT  Chief Complaint   Patient presents with    Vaginitis     Discharge and vaginal odor        HPI  Taylor Patterson is a very pleasant 22 y.o. female who presents with increased vaginal discharge and an unusual odor.  She reports that she had BV 2 months ago with similar symptoms and believes that she may have it now as well.  She also reports that she just recently got out of a relationship and wants to have STD screening performed although she has no symptoms she is concerned about at this time.  She otherwise feels well.      REVIEW OF SYSTEMS  Review of Systems   Constitutional:  Negative for fever and malaise/fatigue.   Respiratory:  Negative for shortness of breath.    Cardiovascular:  Negative for chest pain.   Genitourinary:  Negative for dysuria, frequency and urgency.   Musculoskeletal:  Negative for myalgias.       PAST MEDICAL HISTORY  Patient Active Problem List    Diagnosis Date Noted    Encounter for counseling regarding contraception 01/03/2020       SURGICAL HISTORY  patient denies any surgical history    ALLERGIES  No Known Allergies    CURRENT MEDICATIONS  Home Medications       Reviewed by January Irizarry P.A.-C. (Physician Assistant) on 05/20/23 at 1213  Med List Status: <None>     Medication Last Dose Status   ARIPiprazole (ABILIFY) 10 MG Tab  Active   busPIRone (BUSPAR) 30 MG tablet  Active   carBAMazepine (TEGRETOL) 200 MG Tab  Active   famotidine (PEPCID) 20 MG Tab  Active   FLUoxetine (PROZAC) 20 MG Cap Not Taking Active   KETAMINE HCL SL  Active   lamotrigine (LAMICTAL) 150 MG tablet  Active   lidocaine (XYLOCAINE) 2 % Solution  Active   traZODone (DESYREL) 50 MG Tab PRN Active   venlafaxine (EFFEXOR) 37.5 MG Tab Taking Active                    SOCIAL HISTORY  Social History     Tobacco Use    Smoking status: Some Days     Packs/day: 0.00     Types: Cigarettes    Smokeless tobacco: Never   Vaping Use    Vaping Use: Every day    Substances:  "Nicotine, THC    Devices: Disposable   Substance and Sexual Activity    Alcohol use: Yes     Comment: occ    Drug use: Yes     Frequency: 1.0 times per week     Types: Marijuana     Comment: Marijuana, Acid    Sexual activity: Yes     Partners: Male     Birth control/protection: Condom       FAMILY HISTORY  Family History   Problem Relation Age of Onset    Lupus Mother     No Known Problems Father     No Known Problems Brother           Objective:     VITAL SIGNS: /70 (BP Location: Right arm, Patient Position: Sitting, BP Cuff Size: Adult)   Pulse 80   Temp 36.8 °C (98.2 °F) (Temporal)   Resp 12   Ht 1.6 m (5' 3\")   Wt 81.6 kg (180 lb)   SpO2 98%   BMI 31.89 kg/m²     PHYSICAL EXAM  Physical Exam  Vitals reviewed.   Constitutional:       General: She is not in acute distress.     Appearance: Normal appearance. She is not ill-appearing or toxic-appearing.   HENT:      Head: Normocephalic and atraumatic.      Mouth/Throat:      Mouth: Mucous membranes are moist.   Eyes:      Conjunctiva/sclera: Conjunctivae normal.   Pulmonary:      Effort: Pulmonary effort is normal.   Skin:     General: Skin is warm and dry.   Neurological:      General: No focal deficit present.      Mental Status: She is alert and oriented to person, place, and time.   Psychiatric:         Mood and Affect: Mood normal.         Assessment/Plan:     1. Vaginal odor  - VAGINAL PATHOGENS DNA PANEL; Future  - metroNIDAZOLE (FLAGYL) 500 MG Tab; Take 1 Tablet by mouth 2 times a day for 7 days.  Dispense: 14 Tablet; Refill: 0    2. Screening examination for STD (sexually transmitted disease)  - POCT Urinalysis  - Chlamydia/GC, PCR (Genital/Anal swab)  - doxycycline (VIBRAMYCIN) 100 MG Tab; Take 1 Tablet by mouth 2 times a day for 7 days.  Dispense: 14 Tablet; Refill: 0  - POCT Pregnancy    Other orders  - venlafaxine (EFFEXOR) 37.5 MG Tab; Take 37.5 mg by mouth every day.  -Return to clinic as needed or if symptoms fail to " improve    MDM/Comments:  Patient has stable vital signs and is non-toxic appearing.  Shared decision making used, patient decided to just have chlamydia and gonorrhea testing done at this time.  She demonstrated understanding of her injection of other STD screening.  Empiric treatment started for BV given patient's history of BV infections.  Additionally, patient opted to be empirically treated for STDs with  doxycycline for 7 days. She refused the Rocephin shot but will return to the clinic if her STD screen comes back positive for Gonorrhea.  Patient will be informed of her results and will return to clinic in 3 months if she test positive for gonorrhea or chlamydia.  Patient demonstrated understanding of treatment plan at this time and will RTC if symptoms worsen or fail to resolve.     Differential diagnosis, natural history, supportive care, and indications for immediate follow-up discussed. All questions answered. Patient agrees with the plan of care.    Follow-up as needed if symptoms worsen or fail to improve to PCP, Urgent care or Emergency Room.    I have personally reviewed prior external notes and test results pertinent to today's visit.  I have independently reviewed and interpreted all diagnostics ordered during this urgent care acute visit.   Discussed management options (risks,benefits, and alternatives to treatment). Pt expresses understanding and the treatment plan was agreed upon. Questions were encouraged and answered to pt's satisfaction.    Please note that this dictation was created using voice recognition software. I have made a reasonable attempt to correct obvious errors, but I expect that there are errors of grammar and possibly content that I did not discover before finalizing the note.

## 2023-05-21 LAB
C TRACH DNA GENITAL QL NAA+PROBE: NEGATIVE
N GONORRHOEA DNA GENITAL QL NAA+PROBE: NEGATIVE
SPECIMEN SOURCE: NORMAL

## 2024-04-10 ENCOUNTER — OFFICE VISIT (OUTPATIENT)
Dept: URGENT CARE | Facility: PHYSICIAN GROUP | Age: 24
End: 2024-04-10

## 2024-04-10 ENCOUNTER — HOSPITAL ENCOUNTER (EMERGENCY)
Facility: MEDICAL CENTER | Age: 24
End: 2024-04-10
Attending: EMERGENCY MEDICINE
Payer: COMMERCIAL

## 2024-04-10 VITALS
HEIGHT: 63 IN | TEMPERATURE: 98 F | OXYGEN SATURATION: 97 % | HEART RATE: 70 BPM | DIASTOLIC BLOOD PRESSURE: 64 MMHG | WEIGHT: 187.17 LBS | RESPIRATION RATE: 16 BRPM | SYSTOLIC BLOOD PRESSURE: 120 MMHG | BODY MASS INDEX: 33.16 KG/M2

## 2024-04-10 VITALS
TEMPERATURE: 97.5 F | HEIGHT: 63 IN | BODY MASS INDEX: 32.92 KG/M2 | DIASTOLIC BLOOD PRESSURE: 78 MMHG | HEART RATE: 67 BPM | RESPIRATION RATE: 12 BRPM | OXYGEN SATURATION: 100 % | WEIGHT: 185.8 LBS | SYSTOLIC BLOOD PRESSURE: 112 MMHG

## 2024-04-10 DIAGNOSIS — R10.13 EPIGASTRIC ABDOMINAL PAIN: ICD-10-CM

## 2024-04-10 DIAGNOSIS — R10.13 DYSPEPSIA: ICD-10-CM

## 2024-04-10 LAB
ALBUMIN SERPL BCP-MCNC: 4.5 G/DL (ref 3.2–4.9)
ALBUMIN/GLOB SERPL: 1.6 G/DL
ALP SERPL-CCNC: 66 U/L (ref 30–99)
ALT SERPL-CCNC: 11 U/L (ref 2–50)
ANION GAP SERPL CALC-SCNC: 11 MMOL/L (ref 7–16)
APPEARANCE UR: CLEAR
APPEARANCE UR: NORMAL
AST SERPL-CCNC: 20 U/L (ref 12–45)
BASOPHILS # BLD AUTO: 0.3 % (ref 0–1.8)
BASOPHILS # BLD: 0.04 K/UL (ref 0–0.12)
BILIRUB SERPL-MCNC: 0.3 MG/DL (ref 0.1–1.5)
BILIRUB UR QL STRIP.AUTO: NEGATIVE
BILIRUB UR STRIP-MCNC: NEGATIVE MG/DL
BUN SERPL-MCNC: 9 MG/DL (ref 8–22)
CALCIUM ALBUM COR SERPL-MCNC: 8.7 MG/DL (ref 8.5–10.5)
CALCIUM SERPL-MCNC: 9.1 MG/DL (ref 8.5–10.5)
CHLORIDE SERPL-SCNC: 106 MMOL/L (ref 96–112)
CO2 SERPL-SCNC: 20 MMOL/L (ref 20–33)
COLOR UR AUTO: NORMAL
COLOR UR: YELLOW
CREAT SERPL-MCNC: 0.49 MG/DL (ref 0.5–1.4)
EOSINOPHIL # BLD AUTO: 0 K/UL (ref 0–0.51)
EOSINOPHIL NFR BLD: 0 % (ref 0–6.9)
ERYTHROCYTE [DISTWIDTH] IN BLOOD BY AUTOMATED COUNT: 43.8 FL (ref 35.9–50)
GFR SERPLBLD CREATININE-BSD FMLA CKD-EPI: 135 ML/MIN/1.73 M 2
GLOBULIN SER CALC-MCNC: 2.9 G/DL (ref 1.9–3.5)
GLUCOSE SERPL-MCNC: 94 MG/DL (ref 65–99)
GLUCOSE UR STRIP.AUTO-MCNC: NEGATIVE MG/DL
GLUCOSE UR STRIP.AUTO-MCNC: NEGATIVE MG/DL
HCG SERPL QL: NEGATIVE
HCT VFR BLD AUTO: 41.1 % (ref 37–47)
HGB BLD-MCNC: 13.9 G/DL (ref 12–16)
IMM GRANULOCYTES # BLD AUTO: 0.05 K/UL (ref 0–0.11)
IMM GRANULOCYTES NFR BLD AUTO: 0.4 % (ref 0–0.9)
KETONES UR STRIP.AUTO-MCNC: ABNORMAL MG/DL
KETONES UR STRIP.AUTO-MCNC: NEGATIVE MG/DL
LEUKOCYTE ESTERASE UR QL STRIP.AUTO: NEGATIVE
LEUKOCYTE ESTERASE UR QL STRIP.AUTO: NEGATIVE
LIPASE SERPL-CCNC: 19 U/L (ref 11–82)
LYMPHOCYTES # BLD AUTO: 1.89 K/UL (ref 1–4.8)
LYMPHOCYTES NFR BLD: 13.6 % (ref 22–41)
MCH RBC QN AUTO: 30.2 PG (ref 27–33)
MCHC RBC AUTO-ENTMCNC: 33.8 G/DL (ref 32.2–35.5)
MCV RBC AUTO: 89.3 FL (ref 81.4–97.8)
MICRO URNS: ABNORMAL
MONOCYTES # BLD AUTO: 0.59 K/UL (ref 0–0.85)
MONOCYTES NFR BLD AUTO: 4.2 % (ref 0–13.4)
NEUTROPHILS # BLD AUTO: 11.34 K/UL (ref 1.82–7.42)
NEUTROPHILS NFR BLD: 81.5 % (ref 44–72)
NITRITE UR QL STRIP.AUTO: NEGATIVE
NITRITE UR QL STRIP.AUTO: NEGATIVE
NRBC # BLD AUTO: 0 K/UL
NRBC BLD-RTO: 0 /100 WBC (ref 0–0.2)
PH UR STRIP.AUTO: 7 [PH] (ref 5–8)
PH UR STRIP.AUTO: 8 [PH] (ref 5–8)
PLATELET # BLD AUTO: 272 K/UL (ref 164–446)
PMV BLD AUTO: 9.4 FL (ref 9–12.9)
POCT INT CON NEG: NEGATIVE
POCT INT CON POS: POSITIVE
POCT URINE PREGNANCY TEST: NEGATIVE
POTASSIUM SERPL-SCNC: 4 MMOL/L (ref 3.6–5.5)
PROT SERPL-MCNC: 7.4 G/DL (ref 6–8.2)
PROT UR QL STRIP: 30 MG/DL
PROT UR QL STRIP: NEGATIVE MG/DL
RBC # BLD AUTO: 4.6 M/UL (ref 4.2–5.4)
RBC UR QL AUTO: NEGATIVE
RBC UR QL AUTO: NEGATIVE
SODIUM SERPL-SCNC: 137 MMOL/L (ref 135–145)
SP GR UR STRIP.AUTO: 1.03
SP GR UR STRIP.AUTO: 1.03
UROBILINOGEN UR STRIP-MCNC: 0.2 MG/DL
UROBILINOGEN UR STRIP.AUTO-MCNC: 0.2 MG/DL
WBC # BLD AUTO: 13.9 K/UL (ref 4.8–10.8)

## 2024-04-10 PROCEDURE — 81003 URINALYSIS AUTO W/O SCOPE: CPT

## 2024-04-10 PROCEDURE — 3074F SYST BP LT 130 MM HG: CPT

## 2024-04-10 PROCEDURE — 3078F DIAST BP <80 MM HG: CPT

## 2024-04-10 PROCEDURE — A9270 NON-COVERED ITEM OR SERVICE: HCPCS | Performed by: EMERGENCY MEDICINE

## 2024-04-10 PROCEDURE — 83690 ASSAY OF LIPASE: CPT

## 2024-04-10 PROCEDURE — 700102 HCHG RX REV CODE 250 W/ 637 OVERRIDE(OP): Performed by: EMERGENCY MEDICINE

## 2024-04-10 PROCEDURE — 80053 COMPREHEN METABOLIC PANEL: CPT

## 2024-04-10 PROCEDURE — 81002 URINALYSIS NONAUTO W/O SCOPE: CPT

## 2024-04-10 PROCEDURE — 85025 COMPLETE CBC W/AUTO DIFF WBC: CPT

## 2024-04-10 PROCEDURE — 84703 CHORIONIC GONADOTROPIN ASSAY: CPT

## 2024-04-10 PROCEDURE — 36415 COLL VENOUS BLD VENIPUNCTURE: CPT

## 2024-04-10 PROCEDURE — 700111 HCHG RX REV CODE 636 W/ 250 OVERRIDE (IP): Mod: UD | Performed by: EMERGENCY MEDICINE

## 2024-04-10 PROCEDURE — 81025 URINE PREGNANCY TEST: CPT

## 2024-04-10 PROCEDURE — 99284 EMERGENCY DEPT VISIT MOD MDM: CPT

## 2024-04-10 PROCEDURE — 99214 OFFICE O/P EST MOD 30 MIN: CPT

## 2024-04-10 RX ORDER — FLUOXETINE 20 MG/1
20 TABLET ORAL
COMMUNITY
End: 2024-04-10

## 2024-04-10 RX ORDER — OMEPRAZOLE 40 MG/1
40 CAPSULE, DELAYED RELEASE ORAL DAILY
Qty: 30 CAPSULE | Refills: 0 | Status: SHIPPED | OUTPATIENT
Start: 2024-04-10

## 2024-04-10 RX ORDER — VENLAFAXINE 37.5 MG/1
TABLET ORAL
COMMUNITY
End: 2024-04-10

## 2024-04-10 RX ORDER — ONDANSETRON 4 MG/1
4 TABLET, ORALLY DISINTEGRATING ORAL EVERY 8 HOURS PRN
Qty: 8 TABLET | Refills: 0 | Status: SHIPPED | OUTPATIENT
Start: 2024-04-10

## 2024-04-10 RX ORDER — POLYETHYLENE GLYCOL-3350 AND ELECTROLYTES 236; 6.74; 5.86; 2.97; 22.74 G/274.31G; G/274.31G; G/274.31G; G/274.31G; G/274.31G
POWDER, FOR SOLUTION ORAL
COMMUNITY
End: 2024-04-10

## 2024-04-10 RX ORDER — ARIPIPRAZOLE 2 MG/1
TABLET ORAL
COMMUNITY

## 2024-04-10 RX ORDER — ONDANSETRON 4 MG/1
4 TABLET, ORALLY DISINTEGRATING ORAL ONCE
Status: COMPLETED | OUTPATIENT
Start: 2024-04-10 | End: 2024-04-10

## 2024-04-10 RX ADMIN — ONDANSETRON 4 MG: 4 TABLET, ORALLY DISINTEGRATING ORAL at 15:32

## 2024-04-10 RX ADMIN — LIDOCAINE HYDROCHLORIDE 15 ML: 20 SOLUTION ORAL; TOPICAL at 15:32

## 2024-04-10 ASSESSMENT — ENCOUNTER SYMPTOMS
DIARRHEA: 0
SHORTNESS OF BREATH: 0
CONSTIPATION: 0
ABDOMINAL PAIN: 1
NECK PAIN: 1
VOMITING: 0
BACK PAIN: 1
BLOOD IN STOOL: 0
FEVER: 0
NAUSEA: 1
FALLS: 0

## 2024-04-10 ASSESSMENT — PAIN DESCRIPTION - PAIN TYPE: TYPE: ACUTE PAIN

## 2024-04-10 NOTE — ED NOTES
Pt ambulated to room [address] with steady gait. Pt changed into gown and placed on SpO2 and BP monitors. Chart up for ERP.

## 2024-04-10 NOTE — ED TRIAGE NOTES
.  Chief Complaint   Patient presents with    Abdominal Pain     Pt reports eating molded cheese yesterday and thinks that is what is causing her pain, nauseated     Back Pain     X 3 days, denies truama      Pt ambulated to triage with above complaint.    Pt educated on triage process and returned to lobby.

## 2024-04-10 NOTE — ED PROVIDER NOTES
ED Provider Note    CHIEF COMPLAINT  Chief Complaint   Patient presents with    Abdominal Pain     Pt reports eating molded cheese yesterday and thinks that is what is causing her pain, nauseated     Back Pain     X 3 days, denies truama       LIMITATION TO HISTORY   None    HPI    Taylor Patterson is a 23 y.o. female who presents to the Emergency Department for epigastric abdominal pain.  Pain began today.  She did eat moldy cheese on accident yesterday.  No history of gastritis or ulcer.  She rarely uses NSAIDs and occasionally uses alcohol.  No fatty food intolerance.  This is the first time she has had a pain like this.  Denies fever.  She has had nausea but no vomiting.  Denies diarrhea and constipation.    OUTSIDE HISTORIAN(S):  None    EXTERNAL RECORDS REVIEWED  External family medicine note reviewed from today for neck pain back pain chest pain upset stomach and epigastric pain after eating moldy cheese.  She was sent to the ER for concerns of cholecystitis.    REVIEW OF SYSTEMS  Pertinent positives include: EpiGastric abdominal pain and nausea.  Pertinent negatives include: Denies fever dysuria urgency frequency hematuria kidney stones ovarian cysts pelvic infections and endometriosis.      PAST MEDICAL HISTORY  Past Medical History:   Diagnosis Date    Anxiety     Depression        FAMILY HISTORY  Family History   Problem Relation Age of Onset    Lupus Mother     No Known Problems Father     No Known Problems Brother        SOCIAL HISTORY  Social History     Tobacco Use    Smoking status: Former     Types: Cigarettes    Smokeless tobacco: Never   Vaping Use    Vaping Use: Every day    Substances: Nicotine, THC    Devices: Disposable   Substance Use Topics    Alcohol use: Yes     Comment: occ    Drug use: Yes     Frequency: 1.0 times per week     Types: Marijuana     Comment: Marijuana, Acid     Social History     Substance and Sexual Activity   Drug Use Yes    Frequency: 1.0 times per week     "Types: Marijuana    Comment: Marijuana, Acid       SURGICAL HISTORY  History reviewed. No pertinent surgical history.    CURRENT MEDICATIONS  No current facility-administered medications for this encounter.    Current Outpatient Medications:     ARIPiprazole (ABILIFY) 2 MG tablet, , Disp: , Rfl:     venlafaxine (EFFEXOR) 37.5 MG Tab, Take 37.5 mg by mouth every day. (Patient not taking: Reported on 4/10/2024), Disp: , Rfl:     lidocaine (XYLOCAINE) 2 % Solution, Take 15 mL by mouth every four hours as needed for Throat/Mouth Pain. Gargle and spit every 4 hours as needed, Disp: 120 mL, Rfl: 0    ARIPiprazole (ABILIFY) 10 MG Tab, , Disp: , Rfl:     traZODone (DESYREL) 50 MG Tab, Take 25 mg by mouth at bedtime as needed for Sleep. (Patient not taking: Reported on 4/10/2024), Disp: , Rfl:     ALLERGIES  No Known Allergies    PHYSICAL EXAM  VITAL SIGNS: /69   Pulse 67   Temp 36.3 °C (97.4 °F) (Temporal)   Resp 14   Ht 1.6 m (5' 3\")   Wt 84.9 kg (187 lb 2.7 oz)   SpO2 98%   BMI 33.16 kg/m²   Reviewed and afebrile  Constitutional: Well developed, Well nourished, well-appearing elevated BMI.  HENT: Normocephalic, atraumatic, bilateral external ears normal,   Eyes: PERRLA, conjunctiva pink, no scleral icterus.   Cardiovascular: Regular rate and rhythm. No murmurs, rubs or gallops.  No dependent edema or calf tenderness  Respiratory: Lungs clear to auscultation bilaterally. No wheezes, rales, or rhonchi.  Abdominal:  Abdomen soft, extremely minimal epigastric tenderness, non distended. No rebound, or guarding.  Tuttle sign negative  Skin: No erythema, no rash. No wounds or bruising.  Genitourinary: No costovertebral angle tenderness.   Musculoskeletal: no deformities.   Neurologic: Alert & oriented x 3, cranial nerves 2-12 intact by passive exam.  Moves 4 limbs with symmetric strength.  Psychiatric: Affect normal, Judgment normal, Mood normal.     LABS Ordered and Reviewed by Me:  Results for orders placed or " performed during the hospital encounter of 04/10/24   CBC WITH DIFFERENTIAL   Result Value Ref Range    WBC 13.9 (H) 4.8 - 10.8 K/uL    RBC 4.60 4.20 - 5.40 M/uL    Hemoglobin 13.9 12.0 - 16.0 g/dL    Hematocrit 41.1 37.0 - 47.0 %    MCV 89.3 81.4 - 97.8 fL    MCH 30.2 27.0 - 33.0 pg    MCHC 33.8 32.2 - 35.5 g/dL    RDW 43.8 35.9 - 50.0 fL    Platelet Count 272 164 - 446 K/uL    MPV 9.4 9.0 - 12.9 fL    Neutrophils-Polys 81.50 (H) 44.00 - 72.00 %    Lymphocytes 13.60 (L) 22.00 - 41.00 %    Monocytes 4.20 0.00 - 13.40 %    Eosinophils 0.00 0.00 - 6.90 %    Basophils 0.30 0.00 - 1.80 %    Immature Granulocytes 0.40 0.00 - 0.90 %    Nucleated RBC 0.00 0.00 - 0.20 /100 WBC    Neutrophils (Absolute) 11.34 (H) 1.82 - 7.42 K/uL    Lymphs (Absolute) 1.89 1.00 - 4.80 K/uL    Monos (Absolute) 0.59 0.00 - 0.85 K/uL    Eos (Absolute) 0.00 0.00 - 0.51 K/uL    Baso (Absolute) 0.04 0.00 - 0.12 K/uL    Immature Granulocytes (abs) 0.05 0.00 - 0.11 K/uL    NRBC (Absolute) 0.00 K/uL   COMP METABOLIC PANEL   Result Value Ref Range    Sodium 137 135 - 145 mmol/L    Potassium 4.0 3.6 - 5.5 mmol/L    Chloride 106 96 - 112 mmol/L    Co2 20 20 - 33 mmol/L    Anion Gap 11.0 7.0 - 16.0    Glucose 94 65 - 99 mg/dL    Bun 9 8 - 22 mg/dL    Creatinine 0.49 (L) 0.50 - 1.40 mg/dL    Calcium 9.1 8.5 - 10.5 mg/dL    Correct Calcium 8.7 8.5 - 10.5 mg/dL    AST(SGOT) 20 12 - 45 U/L    ALT(SGPT) 11 2 - 50 U/L    Alkaline Phosphatase 66 30 - 99 U/L    Total Bilirubin 0.3 0.1 - 1.5 mg/dL    Albumin 4.5 3.2 - 4.9 g/dL    Total Protein 7.4 6.0 - 8.2 g/dL    Globulin 2.9 1.9 - 3.5 g/dL    A-G Ratio 1.6 g/dL   LIPASE   Result Value Ref Range    Lipase 19 11 - 82 U/L   HCG QUAL SERUM   Result Value Ref Range    Beta-Hcg Qualitative Serum Negative Negative   URINALYSIS,CULTURE IF INDICATED    Specimen: Urine, Clean Catch   Result Value Ref Range    Color Yellow     Character Clear     Specific Gravity 1.030 <1.035    Ph 8.0 5.0 - 8.0    Glucose Negative  Negative mg/dL    Ketones Trace (A) Negative mg/dL    Protein Negative Negative mg/dL    Bilirubin Negative Negative    Urobilinogen, Urine 0.2 Negative    Nitrite Negative Negative    Leukocyte Esterase Negative Negative    Occult Blood Negative Negative    Micro Urine Req see below    ESTIMATED GFR   Result Value Ref Range    GFR (CKD-EPI) 135 >60 mL/min/1.73 m 2       ED COURSE:      INTERVENTIONS BY ME:  Medications   ondansetron (Zofran ODT) dispertab 4 mg (4 mg Oral Given 4/10/24 1532)   GI Cocktail (hyoscyamine-lidocaine-Maalox) oral susp cup 15 mL (15 mL Oral Given 4/10/24 1532)       - Patient reassessed and response to intervention pregnant with GI cocktail and Zofran      ASSESSMENT, COURSE AND PLAN:  PROBLEMS EVALUATED THIS VISIT:    Patient presents with epigastric abdominal pain without tenderness after eating moldy cheese.  Given response to GI cocktail is most likely this pain is dyspeptic representing GERD, gastritis or peptic ulcer disease.  There is no evidence of acute abdominal process based on exam and labs.  Imaging considered but not done since its unlikely to         DISPOSITION AND DISCUSSIONS      RISK:  Moderate given need for prescription ondansetron and omeprazole     PLAN:  Discharge Medication List as of 4/10/2024  4:52 PM        START taking these medications    Details   ondansetron (ZOFRAN ODT) 4 MG TABLET DISPERSIBLE Take 1 Tablet by mouth every 8 hours as needed for Nausea/Vomiting., Disp-8 Tablet, R-0, Normal      omeprazole (PRILOSEC) 40 MG delayed-release capsule Take 1 Capsule by mouth every day., Disp-30 Capsule, R-0, Normal             Avoid NSAIDs and alcohol 1 month    Gastritis handout given    Return for severe pain fever dizziness GI bleed    Followup:  Sherin Amaya, P.A.  [address]  [place] [id number] [phone]    Schedule an appointment as soon as possible for a visit   As needed if not better 4-7 days for referral to  GI      CONDITION: Stable.     FINAL IMPRESSION  1. Epigastric abdominal pain    2. Dyspepsia         Electronically signed by: Raul Pedraza M.D., 4/10/2024 3:01 PM

## 2024-04-10 NOTE — PROGRESS NOTES
Subjective:     CHIEF COMPLAINT  Chief Complaint   Patient presents with    Neck Pain     Back pain, chest pain, upset stomach, stining/burning sensation, x1 day       HPI  Taylor Patterson is a very pleasant 23 y.o. female who presents with epigastric abdominal pain for the past day.  She reports that the pain has been radiating into her back and her neck.  Prior to the onset of this pain she ate leftover cheese from a charcuterie board and had eaten approximately 6 pieces before she noticed that the cheese had mold on it.  She has not had any vomiting, but has had some nausea.  She does report that she has baseline nausea that has been an ongoing issue for several months.  She denies any injuries or events preceding the onset of her neck/back pain.  She reports that she has been having overall normal bowel movements, although she did have reduced volume to her bowel movements that seem to have resolved back to baseline over the past 3 days.  She reports she has never had abdominal pain similar to this in the past.  She has not tried any over-the-counter medications such as Tums.  She has not had any fevers.    REVIEW OF SYSTEMS  Review of Systems   Constitutional:  Negative for fever.   Respiratory:  Negative for shortness of breath.    Gastrointestinal:  Positive for abdominal pain and nausea. Negative for blood in stool, constipation, diarrhea, melena and vomiting.   Musculoskeletal:  Positive for back pain and neck pain. Negative for falls.       PAST MEDICAL HISTORY  Patient Active Problem List    Diagnosis Date Noted    Encounter for counseling regarding contraception 01/03/2020       SURGICAL HISTORY  patient denies any surgical history    ALLERGIES  No Known Allergies    CURRENT MEDICATIONS  Home Medications       Reviewed by January Irizarry P.A.-C. (Physician Assistant) on 04/10/24 at 1230  Med List Status: <None>     Medication Last Dose Status   ARIPiprazole (ABILIFY) 10 MG Tab Not  "Taking Active   ARIPiprazole (ABILIFY) 2 MG tablet Not Taking Active   lidocaine (XYLOCAINE) 2 % Solution  Active   traZODone (DESYREL) 50 MG Tab Not Taking Active   venlafaxine (EFFEXOR) 37.5 MG Tab Not Taking Active                    SOCIAL HISTORY  Social History     Tobacco Use    Smoking status: Former     Types: Cigarettes    Smokeless tobacco: Never   Vaping Use    Vaping Use: Every day    Substances: Nicotine, THC    Devices: Disposable   Substance and Sexual Activity    Alcohol use: Yes     Comment: occ    Drug use: Yes     Frequency: 1.0 times per week     Types: Marijuana     Comment: Marijuana, Acid    Sexual activity: Yes     Partners: Male     Birth control/protection: Condom       FAMILY HISTORY  Family History   Problem Relation Age of Onset    Lupus Mother     No Known Problems Father     No Known Problems Brother           Objective:     VITAL SIGNS: /78 (BP Location: Right arm, Patient Position: Sitting, BP Cuff Size: Adult)   Pulse 67   Temp 36.4 °C (97.5 °F) (Temporal)   Resp 12   Ht 1.6 m (5' 3\")   Wt 84.3 kg (185 lb 12.8 oz)   SpO2 100%   BMI 32.91 kg/m²     PHYSICAL EXAM  Physical Exam  Vitals reviewed.   Constitutional:       General: She is not in acute distress.     Appearance: Normal appearance. She is ill-appearing. She is not toxic-appearing or diaphoretic.   HENT:      Head: Normocephalic and atraumatic.   Cardiovascular:      Rate and Rhythm: Normal rate and regular rhythm.      Heart sounds: Normal heart sounds.   Pulmonary:      Effort: Pulmonary effort is normal. No respiratory distress.      Breath sounds: Normal breath sounds. No stridor. No wheezing, rhonchi or rales.   Abdominal:      General: Abdomen is flat. Bowel sounds are normal. There is no distension.      Palpations: Abdomen is soft. There is no mass.      Tenderness: There is generalized abdominal tenderness and tenderness in the right upper quadrant and epigastric area. There is no right CVA tenderness, " left CVA tenderness, guarding or rebound. Positive signs include Tuttle's sign. Negative signs include McBurney's sign.      Hernia: No hernia is present.          Comments: Generalized abdominal discomfort to palpation with increased discomfort in epigastric/right upper quadrant.   Musculoskeletal:      Cervical back: No tenderness or bony tenderness.      Thoracic back: No tenderness or bony tenderness.      Lumbar back: No tenderness or bony tenderness.        Back:       Comments: Area of pain from the lower cervical spine to upper lumbar.  No tenderness to palpation.  No bony tenderness.   Skin:     General: Skin is warm and dry.      Coloration: Skin is pale. Skin is not jaundiced.   Neurological:      General: No focal deficit present.      Mental Status: She is alert.   Psychiatric:         Mood and Affect: Mood normal.         Assessment/Plan:     1. Epigastric abdominal pain  - POCT Urinalysis  - POCT Pregnancy    Other orders  - ARIPiprazole (ABILIFY) 2 MG tablet;  (Patient not taking: Reported on 4/10/2024)  -Patient has been referred to the emergency department for further evaluation of epigastric abdominal pain with radiation into the back    MDM/Comments:  Patient has stable vital signs and is non-toxic appearing.  Urinalysis performed in office positive for protein.  hCG negative.  Patient is experiencing epigastric/right upper quadrant abdominal pain that is radiating into the back and neck.  Patient did display generalized abdominal discomfort upon palpation with increased pain in the epigastric/right upper quadrant region.  Discussed with patient that this matter be more appropriate to be ruled out in the emergency department given that I have concern for possible cholecystitis.  Patient demonstrated understanding and has agreed to be seen in the emergency department.  Patient will be driven via private vehicle by her partner.  [facility] transfer center called ahead to inform of impending transfer.       Differential diagnosis, natural history, supportive care, and indications for immediate follow-up discussed. All questions answered. Patient agrees with the plan of care.    Follow-up as needed if symptoms worsen or fail to improve to PCP, Urgent care or Emergency Room.    I have personally reviewed prior external notes and test results pertinent to today's visit.  I have independently reviewed and interpreted all diagnostics ordered during this urgent care acute visit.   Discussed management options (risks,benefits, and alternatives to treatment). Pt expresses understanding and the treatment plan was agreed upon. Questions were encouraged and answered to pt's satisfaction.    Please note that this dictation was created using voice recognition software. I have made a reasonable attempt to correct obvious errors, but I expect that there are errors of grammar and possibly content that I did not discover before finalizing the note.

## 2024-04-10 NOTE — DISCHARGE INSTRUCTIONS
Gastritis    Avoid all ibuprofen and naproxen, aspirin and alcohol.  Take Prilosec 40 mg a day for 4 weeks.  Return for worsening pain, bleeding or pain and fever.  Followup with your doctor or GI if not better in 3-4 days.   Take maalox and tylenol if needed until acid blocker takes effect.      You have gastritis. Gastritis is an irritation of the stomach. This is often caused by medications, but can be from anything that bothers the stomach.   Other stomach irritants are:  Alcohol.  Caffeine.  Nicotine.  Spicy or acid foods.     Medications for pain and arthritis. Aspirin and other anti-inflammatory medicines such as ibuprofen (Advil), naproxen (Aleve), and ketoprofen (Orudis) can be highly irritating.  Emotional distress.     Symptoms of gastritis may include:  Abdominal pain. Indigestion. Nausea and or vomiting. Bleeding.      Some patients with chronic gastritis and ulcers have been infected by a germ. They may need special testing. Medications which kill germs can be used to cure this condition.    Treatment includes avoiding the substances mentioned above that are known to cause stomach trouble.    Medications used to treat gastritis can include:  Antacids.  Medicines to control vomiting.   Acid blocking medicines (Axid, Pepcid, Prevacid, Prilosec, Tagamet, Zantac).     Symptoms of gastritis usually improve within 2-3 days of starting treatment. Call your caregiver if you are not better in a few days.    Seek medical care if you:    Have increased stomach pain or chest pain.  Vomit blood.  Faint or feel light headed. Can not keep fluids down.  Pass bloody or black stools.  Develop severe back pain.     Document Released: 12/18/2006  Document Re-Released: 06/30/2008  [employer]® Patient Information ©2008 [employer].

## 2024-07-23 ENCOUNTER — OFFICE VISIT (OUTPATIENT)
Dept: URGENT CARE | Facility: CLINIC | Age: 24
End: 2024-07-23
Payer: COMMERCIAL

## 2024-07-23 ENCOUNTER — HOSPITAL ENCOUNTER (OUTPATIENT)
Facility: MEDICAL CENTER | Age: 24
End: 2024-07-23
Payer: COMMERCIAL

## 2024-07-23 VITALS
DIASTOLIC BLOOD PRESSURE: 66 MMHG | OXYGEN SATURATION: 97 % | HEART RATE: 81 BPM | RESPIRATION RATE: 18 BRPM | TEMPERATURE: 97 F | WEIGHT: 184 LBS | SYSTOLIC BLOOD PRESSURE: 132 MMHG | BODY MASS INDEX: 32.6 KG/M2 | HEIGHT: 63 IN

## 2024-07-23 DIAGNOSIS — N76.0 VAGINOSIS: ICD-10-CM

## 2024-07-23 LAB
APPEARANCE UR: NORMAL
BILIRUB UR STRIP-MCNC: NEGATIVE MG/DL
COLOR UR AUTO: YELLOW
GLUCOSE UR STRIP.AUTO-MCNC: NEGATIVE MG/DL
KETONES UR STRIP.AUTO-MCNC: NORMAL MG/DL
LEUKOCYTE ESTERASE UR QL STRIP.AUTO: NEGATIVE
NITRITE UR QL STRIP.AUTO: NEGATIVE
PH UR STRIP.AUTO: 5 [PH] (ref 5–8)
POCT INT CON NEG: NEGATIVE
POCT INT CON POS: POSITIVE
POCT URINE PREGNANCY TEST: NEGATIVE
PROT UR QL STRIP: NEGATIVE MG/DL
RBC UR QL AUTO: NORMAL
SP GR UR STRIP.AUTO: 1.03
UROBILINOGEN UR STRIP-MCNC: 0.2 MG/DL

## 2024-07-23 PROCEDURE — 3075F SYST BP GE 130 - 139MM HG: CPT

## 2024-07-23 PROCEDURE — 87491 CHLMYD TRACH DNA AMP PROBE: CPT

## 2024-07-23 PROCEDURE — 99213 OFFICE O/P EST LOW 20 MIN: CPT

## 2024-07-23 PROCEDURE — 87480 CANDIDA DNA DIR PROBE: CPT

## 2024-07-23 PROCEDURE — 81002 URINALYSIS NONAUTO W/O SCOPE: CPT

## 2024-07-23 PROCEDURE — 87591 N.GONORRHOEAE DNA AMP PROB: CPT

## 2024-07-23 PROCEDURE — 87660 TRICHOMONAS VAGIN DIR PROBE: CPT

## 2024-07-23 PROCEDURE — 3078F DIAST BP <80 MM HG: CPT

## 2024-07-23 PROCEDURE — 81025 URINE PREGNANCY TEST: CPT

## 2024-07-23 PROCEDURE — 87510 GARDNER VAG DNA DIR PROBE: CPT

## 2024-07-23 RX ORDER — BUSPIRONE HYDROCHLORIDE 10 MG/1
TABLET ORAL
COMMUNITY
Start: 2024-05-03

## 2024-07-23 RX ORDER — METRONIDAZOLE 500 MG/1
500 TABLET ORAL 2 TIMES DAILY
Qty: 14 TABLET | Refills: 0 | Status: SHIPPED | OUTPATIENT
Start: 2024-07-23 | End: 2024-07-30

## 2024-07-23 ASSESSMENT — FIBROSIS 4 INDEX: FIB4 SCORE: .53207884337252404

## 2024-07-23 ASSESSMENT — ENCOUNTER SYMPTOMS: FEVER: 0

## 2024-07-24 DIAGNOSIS — B37.31 VAGINAL CANDIDIASIS: ICD-10-CM

## 2024-07-24 LAB
C TRACH DNA GENITAL QL NAA+PROBE: NEGATIVE
CANDIDA DNA VAG QL PROBE+SIG AMP: POSITIVE
G VAGINALIS DNA VAG QL PROBE+SIG AMP: POSITIVE
N GONORRHOEA DNA GENITAL QL NAA+PROBE: NEGATIVE
SPECIMEN SOURCE: NORMAL
T VAGINALIS DNA VAG QL PROBE+SIG AMP: NEGATIVE

## 2024-07-24 RX ORDER — FLUCONAZOLE 150 MG/1
150 TABLET ORAL DAILY
Qty: 1 TABLET | Refills: 1 | Status: SHIPPED | OUTPATIENT
Start: 2024-07-24

## 2025-03-10 ENCOUNTER — OFFICE VISIT (OUTPATIENT)
Dept: MEDICAL GROUP | Facility: CLINIC | Age: 25
End: 2025-03-10
Payer: COMMERCIAL

## 2025-03-10 VITALS
BODY MASS INDEX: 34.55 KG/M2 | HEIGHT: 63 IN | DIASTOLIC BLOOD PRESSURE: 62 MMHG | RESPIRATION RATE: 14 BRPM | SYSTOLIC BLOOD PRESSURE: 106 MMHG | TEMPERATURE: 98.5 F | OXYGEN SATURATION: 96 % | HEART RATE: 60 BPM | WEIGHT: 195 LBS

## 2025-03-10 DIAGNOSIS — K62.5 RECTAL BLEEDING: ICD-10-CM

## 2025-03-10 DIAGNOSIS — E66.9 OBESITY (BMI 30-39.9): ICD-10-CM

## 2025-03-10 PROBLEM — D17.30 SUBCUTANEOUS LIPOMA: Status: RESOLVED | Noted: 2023-01-29 | Resolved: 2025-03-10

## 2025-03-10 PROBLEM — K64.9 HEMORRHOIDS: Status: ACTIVE | Noted: 2023-01-29

## 2025-03-10 PROBLEM — D17.30 SUBCUTANEOUS LIPOMA: Status: ACTIVE | Noted: 2023-01-29

## 2025-03-10 PROBLEM — F32.A DEPRESSIVE DISORDER: Status: ACTIVE | Noted: 2023-01-29

## 2025-03-10 PROCEDURE — 99203 OFFICE O/P NEW LOW 30 MIN: CPT | Mod: GE

## 2025-03-10 PROCEDURE — 1126F AMNT PAIN NOTED NONE PRSNT: CPT | Mod: GE

## 2025-03-10 PROCEDURE — 3074F SYST BP LT 130 MM HG: CPT | Mod: GE

## 2025-03-10 PROCEDURE — 3078F DIAST BP <80 MM HG: CPT | Mod: GE

## 2025-03-10 RX ORDER — DULOXETIN HYDROCHLORIDE 30 MG/1
30 CAPSULE, DELAYED RELEASE ORAL DAILY
COMMUNITY
Start: 2025-01-14

## 2025-03-10 SDOH — ECONOMIC STABILITY: FOOD INSECURITY: WITHIN THE PAST 12 MONTHS, YOU WORRIED THAT YOUR FOOD WOULD RUN OUT BEFORE YOU GOT MONEY TO BUY MORE.: PATIENT DECLINED

## 2025-03-10 SDOH — ECONOMIC STABILITY: FOOD INSECURITY: WITHIN THE PAST 12 MONTHS, THE FOOD YOU BOUGHT JUST DIDN'T LAST AND YOU DIDN'T HAVE MONEY TO GET MORE.: PATIENT DECLINED

## 2025-03-10 SDOH — HEALTH STABILITY: PHYSICAL HEALTH: ON AVERAGE, HOW MANY DAYS PER WEEK DO YOU ENGAGE IN MODERATE TO STRENUOUS EXERCISE (LIKE A BRISK WALK)?: 4 DAYS

## 2025-03-10 SDOH — HEALTH STABILITY: PHYSICAL HEALTH: ON AVERAGE, HOW MANY MINUTES DO YOU ENGAGE IN EXERCISE AT THIS LEVEL?: 150+ MIN

## 2025-03-10 SDOH — ECONOMIC STABILITY: INCOME INSECURITY: HOW HARD IS IT FOR YOU TO PAY FOR THE VERY BASICS LIKE FOOD, HOUSING, MEDICAL CARE, AND HEATING?: SOMEWHAT HARD

## 2025-03-10 SDOH — ECONOMIC STABILITY: TRANSPORTATION INSECURITY
IN THE PAST 12 MONTHS, HAS THE LACK OF TRANSPORTATION KEPT YOU FROM MEDICAL APPOINTMENTS OR FROM GETTING MEDICATIONS?: YES

## 2025-03-10 SDOH — ECONOMIC STABILITY: INCOME INSECURITY: IN THE LAST 12 MONTHS, WAS THERE A TIME WHEN YOU WERE NOT ABLE TO PAY THE MORTGAGE OR RENT ON TIME?: PATIENT DECLINED

## 2025-03-10 SDOH — HEALTH STABILITY: MENTAL HEALTH
STRESS IS WHEN SOMEONE FEELS TENSE, NERVOUS, ANXIOUS, OR CAN'T SLEEP AT NIGHT BECAUSE THEIR MIND IS TROUBLED. HOW STRESSED ARE YOU?: ONLY A LITTLE

## 2025-03-10 SDOH — ECONOMIC STABILITY: TRANSPORTATION INSECURITY
IN THE PAST 12 MONTHS, HAS LACK OF TRANSPORTATION KEPT YOU FROM MEETINGS, WORK, OR FROM GETTING THINGS NEEDED FOR DAILY LIVING?: YES

## 2025-03-10 SDOH — ECONOMIC STABILITY: TRANSPORTATION INSECURITY
IN THE PAST 12 MONTHS, HAS LACK OF RELIABLE TRANSPORTATION KEPT YOU FROM MEDICAL APPOINTMENTS, MEETINGS, WORK OR FROM GETTING THINGS NEEDED FOR DAILY LIVING?: YES

## 2025-03-10 SDOH — ECONOMIC STABILITY: HOUSING INSECURITY
IN THE LAST 12 MONTHS, WAS THERE A TIME WHEN YOU DID NOT HAVE A STEADY PLACE TO SLEEP OR SLEPT IN A SHELTER (INCLUDING NOW)?: PATIENT DECLINED

## 2025-03-10 ASSESSMENT — SOCIAL DETERMINANTS OF HEALTH (SDOH)
HOW OFTEN DO YOU GET TOGETHER WITH FRIENDS OR RELATIVES?: PATIENT DECLINED
ARE YOU MARRIED, WIDOWED, DIVORCED, SEPARATED, NEVER MARRIED, OR LIVING WITH A PARTNER?: NEVER MARRIED
HOW OFTEN DO YOU ATTEND CHURCH OR RELIGIOUS SERVICES?: NEVER
HOW OFTEN DO YOU ATTEND CHURCH OR RELIGIOUS SERVICES?: NEVER
WITHIN THE PAST 12 MONTHS, YOU WORRIED THAT YOUR FOOD WOULD RUN OUT BEFORE YOU GOT THE MONEY TO BUY MORE: PATIENT DECLINED
HOW OFTEN DO YOU GET TOGETHER WITH FRIENDS OR RELATIVES?: PATIENT DECLINED
HOW MANY DRINKS CONTAINING ALCOHOL DO YOU HAVE ON A TYPICAL DAY WHEN YOU ARE DRINKING: 1 OR 2
IN THE PAST 12 MONTHS, HAS THE ELECTRIC, GAS, OIL, OR WATER COMPANY THREATENED TO SHUT OFF SERVICE IN YOUR HOME?: PATIENT DECLINED
DO YOU BELONG TO ANY CLUBS OR ORGANIZATIONS SUCH AS CHURCH GROUPS UNIONS, FRATERNAL OR ATHLETIC GROUPS, OR SCHOOL GROUPS?: NO
HOW OFTEN DO YOU HAVE A DRINK CONTAINING ALCOHOL: MONTHLY OR LESS
IN A TYPICAL WEEK, HOW MANY TIMES DO YOU TALK ON THE PHONE WITH FAMILY, FRIENDS, OR NEIGHBORS?: NEVER
DO YOU BELONG TO ANY CLUBS OR ORGANIZATIONS SUCH AS CHURCH GROUPS UNIONS, FRATERNAL OR ATHLETIC GROUPS, OR SCHOOL GROUPS?: NO
ARE YOU MARRIED, WIDOWED, DIVORCED, SEPARATED, NEVER MARRIED, OR LIVING WITH A PARTNER?: NEVER MARRIED
HOW HARD IS IT FOR YOU TO PAY FOR THE VERY BASICS LIKE FOOD, HOUSING, MEDICAL CARE, AND HEATING?: SOMEWHAT HARD
IN A TYPICAL WEEK, HOW MANY TIMES DO YOU TALK ON THE PHONE WITH FAMILY, FRIENDS, OR NEIGHBORS?: NEVER
HOW OFTEN DO YOU HAVE SIX OR MORE DRINKS ON ONE OCCASION: NEVER
HOW OFTEN DO YOU ATTENT MEETINGS OF THE CLUB OR ORGANIZATION YOU BELONG TO?: PATIENT DECLINED
HOW OFTEN DO YOU ATTENT MEETINGS OF THE CLUB OR ORGANIZATION YOU BELONG TO?: PATIENT DECLINED

## 2025-03-10 ASSESSMENT — LIFESTYLE VARIABLES
SKIP TO QUESTIONS 9-10: 1
HOW OFTEN DO YOU HAVE SIX OR MORE DRINKS ON ONE OCCASION: NEVER
HOW MANY STANDARD DRINKS CONTAINING ALCOHOL DO YOU HAVE ON A TYPICAL DAY: 1 OR 2
HOW OFTEN DO YOU HAVE A DRINK CONTAINING ALCOHOL: MONTHLY OR LESS
AUDIT-C TOTAL SCORE: 1

## 2025-03-10 ASSESSMENT — PAIN SCALES - GENERAL: PAINLEVEL_OUTOF10: NO PAIN

## 2025-03-10 ASSESSMENT — PATIENT HEALTH QUESTIONNAIRE - PHQ9: CLINICAL INTERPRETATION OF PHQ2 SCORE: 0

## 2025-03-10 ASSESSMENT — FIBROSIS 4 INDEX: FIB4 SCORE: .53207884337252404

## 2025-03-10 NOTE — PROGRESS NOTES
Subjective:     CC:   Chief Complaint   Patient presents with    Establish Care     Previous PCP [facility]   Rectal bleeding a significant amount, history of hemorrhoids         HISTORY OF THE PRESENT ILLNESS: Patient is a 24 y.o. female. This pleasant patient is here today to establish care and discuss rectal bleeding. His/her prior PCP was [facility].    The rectal bleeding has been spotting for 1 year, that is on and off. The last 2 weeks she has been having episodes of dripping bright red blood. Denies constipation, reports soft stools. Sometimes she has pain without bowel movements. She did have lower back cramping after her last episode. She reports she had a colonoscopy 2 years ago for severe nausea vomiting and diarrhea after taking valproate. She does not recall who did the colonoscopy. She was told she had a small hemorrhoid.      She reports her only medications include cymbalta, zofran PRN and omeprazole PRN. History of suicide attempt in 2/2023, hospitalized.     Last Pap Smear: Never completed seeing gyn for PAP and IUD replacement 3/17    Last Tdap: 2020   Received HPV series: Yes    Exercise: no regular exercise, sedentary. Works in house keeping   Diet: Snacking       No LMP recorded. Patient has had an implant.  Hx STDs: No  Birth control: IUD placed 2020, has an appointment for replacement on 3/17   Menses every month with 3-7 days with moderate bleeding.  Reports moderate cramping and does take OTC analgesics for cramps.  No significant bloating/fluid retention, pelvic pain, or dyspareunia. No abnormal vaginal discharge.  No breast tenderness, mass, nipple discharge, changes in size or contour, or abnormal cyclic discomfort.    Current Outpatient Medications Ordered in Epic   Medication Sig Dispense Refill    DULoxetine (CYMBALTA) 30 MG Cap DR Particles Take 30 mg by mouth every day.      OMEPRAZOLE PO Take 20 mg by mouth.      ondansetron (ZOFRAN ODT) 4 MG TABLET DISPERSIBLE Take 1  "Tablet by mouth every 8 hours as needed for Nausea/Vomiting. 8 Tablet 0    omeprazole (PRILOSEC) 40 MG delayed-release capsule Take 1 Capsule by mouth every day. 30 Capsule 0     No current Epic-ordered facility-administered medications on file.       Family History   Problem Relation Age of Onset    Lupus Mother     No Known Problems Father     No Known Problems Brother     Diabetes Maternal Grandfather     Glaucoma Paternal Grandfather       Past Surgical History:   Procedure Laterality Date    LIPOMA EXCISION  2023    ABD Lipoma Removed    DENTAL EXTRACTION(S)  2016    [place] teeth x4      Social History     Tobacco Use    Smoking status: Former     Current packs/day: 0.00     Types: Cigarettes    Smokeless tobacco: Never   Vaping Use    Vaping status: Every Day    Substances: Nicotine, THC    Devices: Disposable   Substance Use Topics    Alcohol use: Not Currently     Comment: occ monthly    Drug use: Yes     Frequency: 1.0 times per week     Types: Marijuana     Comment: Marijuana, Acid        ROS:   Gen: no fevers/chills, no changes in weight  Eyes: no changes in vision  ENT: no changes in hearing  Pulm: no sob, no cough  CV: no chest pain, no palpitations  GI: no nausea/vomiting, no diarrhea  MSk: no myalgias  Skin: no rash  Neuro: no headaches, no numbness/tingling      Objective:     Exam: /62   Pulse 60   Temp 36.9 °C (98.5 °F) (Temporal)   Resp 14   Ht 1.6 m (5' 3\")   Wt 88.5 kg (195 lb)   SpO2 96%  Body mass index is 34.54 kg/m².    Physical Exam  Exam conducted with a chaperone present.   Constitutional:       Appearance: Normal appearance.   HENT:      Right Ear: Tympanic membrane normal.      Left Ear: Tympanic membrane normal.      Mouth/Throat:      Mouth: Mucous membranes are moist.      Pharynx: Oropharynx is clear.   Eyes:      Extraocular Movements: Extraocular movements intact.      Conjunctiva/sclera: Conjunctivae normal.   Cardiovascular:      Rate and Rhythm: Normal rate and " regular rhythm.      Heart sounds: No murmur heard.  Pulmonary:      Effort: Pulmonary effort is normal.      Breath sounds: Normal breath sounds. No wheezing.   Abdominal:      General: Abdomen is flat.      Palpations: Abdomen is soft.      Tenderness: There is no abdominal tenderness.   Genitourinary:     Rectum: Internal hemorrhoid present. No anal fissure or external hemorrhoid.   Neurological:      General: No focal deficit present.      Mental Status: She is alert and oriented to person, place, and time.   Psychiatric:         Mood and Affect: Mood normal.        Assessment & Plan:   24 y.o. female with the following -    Assessment & Plan  Obesity (BMI 30-39.9)  Patient reports that she has had some weight gain recently. She denies family history of weight issues. She reports that she has been on multiple medications for anxiety and depression in the past and has noticed weight fluctuation with these as well.    Will order TSH with reflex to T4  Orders:    TSH WITH REFLEX TO FT4; Future    Rectal bleeding  Patient with rectal bleeding that is likely secondary to hemorrhoids.  Internal hemorrhoids were palpated on exam and per patient she states that she was told about them 2 years ago and a colonoscopy.  She is unsure who did her colonoscopy 2 years ago.    Reviewed conservative management with patient  Patient does not appear anemic on exam however will order CBC given the amount of blood that she noted in the last 2 weeks  Will refer to gastroenterology for further evaluation and possible colonoscopy.  Did discuss with patient to determine who her last provider who did the colonoscopy was and to call that office to schedule follow-up if possible.  Orders:    CBC WITH DIFFERENTIAL; Future    Referral to Gastroenterology      Health maintenance: Encouraged patient to keep her appointment on 3/17 with [facility] for her cervical cancer screening and IUD replacement  Labs per orders  Immunizations  patient declined at this visit will discuss further at next visit.  Patient counseled about skin care, diet, supplements, and exercise.      Return in about 6 months (around 9/10/2025), or if symptoms worsen or fail to improve.    Tonya Leiva M.D.   PGY2

## 2025-03-10 NOTE — LETTER
[facility]  CELINE Arriola  [address]  [place] [address]  Fax: [phone]   Authorization for Release/Disclosure of   Protected Health Information   Name: TAYLOR PADILLA : 2000 SSN: [id number]   Address: [address] [place] [address]  [place] [address] Phone:    [phone] (home)    I authorize the entity listed below to release/disclose the PHI below to:   [facility]/CELINE Arriola and Tonya Leiva M.D.   Provider or Entity Name: Dr Amaya [facility]      Address   City, [facility], [facility]   Phone:      Fax:     Reason for request: continuity of care   Information to be released:    [  ] LAST COLONOSCOPY,  including any PATH REPORT and follow-up  [  ] LAST FIT/COLOGUARD RESULT [  ] LAST DEXA  [  ] LAST MAMMOGRAM  [  ] LAST PAP  [  ] LAST LABS [  ] RETINA EXAM REPORT  [  ] IMMUNIZATION RECORDS  [  ] Release all info      [  ] Check here and initial the line next to each item to release ALL health information INCLUDING  _____ Care and treatment for drug and / or alcohol abuse  _____ HIV testing, infection status, or AIDS  _____ Genetic Testing    DATES OF SERVICE OR TIME PERIOD TO BE DISCLOSED: _____________  I understand and acknowledge that:  * This Authorization may be revoked at any time by you in writing, except if your health information has already been used or disclosed.  * Your health information that will be used or disclosed as a result of you signing this authorization could be re-disclosed by the recipient. If this occurs, your re-disclosed health information may no longer be protected by State or Federal laws.  * You may refuse to sign this Authorization. Your refusal will not affect your ability to obtain treatment.  * This Authorization becomes effective upon signing and will  on (date) __________.      If no date is indicated, this Authorization will  one (1) year from the signature date.    Name: Taylor Aguillonos  Leonardo  Signature: Date:   3/10/2025     PLEASE FAX REQUESTED RECORDS BACK TO: [phone]

## 2025-03-17 ENCOUNTER — HOSPITAL ENCOUNTER (OUTPATIENT)
Facility: MEDICAL CENTER | Age: 25
End: 2025-03-17
Attending: MIDWIFE
Payer: COMMERCIAL

## 2025-03-17 ENCOUNTER — OFFICE VISIT (OUTPATIENT)
Dept: OBGYN | Facility: CLINIC | Age: 25
End: 2025-03-17
Payer: COMMERCIAL

## 2025-03-17 VITALS — WEIGHT: 192 LBS | BODY MASS INDEX: 34.01 KG/M2 | SYSTOLIC BLOOD PRESSURE: 108 MMHG | DIASTOLIC BLOOD PRESSURE: 64 MMHG

## 2025-03-17 DIAGNOSIS — Z01.419 WELL WOMAN EXAM WITH ROUTINE GYNECOLOGICAL EXAM: ICD-10-CM

## 2025-03-17 DIAGNOSIS — Z11.3 SCREENING EXAMINATION FOR STI: ICD-10-CM

## 2025-03-17 PROCEDURE — 87591 N.GONORRHOEAE DNA AMP PROB: CPT

## 2025-03-17 PROCEDURE — 87491 CHLMYD TRACH DNA AMP PROBE: CPT

## 2025-03-17 PROCEDURE — 3074F SYST BP LT 130 MM HG: CPT | Performed by: MIDWIFE

## 2025-03-17 PROCEDURE — 88142 CYTOPATH C/V THIN LAYER: CPT

## 2025-03-17 PROCEDURE — 3078F DIAST BP <80 MM HG: CPT | Performed by: MIDWIFE

## 2025-03-17 PROCEDURE — 99385 PREV VISIT NEW AGE 18-39: CPT | Performed by: MIDWIFE

## 2025-03-17 ASSESSMENT — FIBROSIS 4 INDEX: FIB4 SCORE: .53207884337252404

## 2025-03-17 NOTE — Clinical Note
REFERRAL APPROVAL NOTICE         Sent on March 17, 2025                   Taylor Bond Leonardo  [address]  [place] [address]                   Dear Ms. Varun Patterson,    After a careful review of the medical information and benefit coverage, [facility] has processed your referral. See below for additional details.    If applicable, you must be actively enrolled with your insurance for coverage of the authorized service. If you have any questions regarding your coverage, please contact your insurance directly.    REFERRAL INFORMATION   Referral #:  [id number]  Referred-To Provider    Referred-By Provider:  Gastroenterology    Tonya Leiva M.D.   [facility]      [address] [place] [address]  [phone] [address] [place] [id number] [phone]    Referral Start Date:  03/10/2025  Referral End Date:   03/10/2026             SCHEDULING  If you do not already have an appointment, please call [phone] to make an appointment.     MORE INFORMATION  If you do not already have a [employer] account, sign up at: [email or web address]  You can access your medical information, make appointments, see lab results, billing information, and more.  If you have questions regarding this referral, please contact  the [facility] Referrals department at:             [phone]. Monday - Friday 8:00AM - 5:00PM.     Sincerely,    [facility]

## 2025-03-17 NOTE — ASSESSMENT & PLAN NOTE
Discuss concerns: Discuss reasoning for pap smears. Discuss length of use with Mirena IUD - due to be removed in 3/2028.  Encourage patient to continue with PCP. Encourage patient to establish with dermatologist.  Encourage self breast exams. Warning signs discussed.  Discuss genetic testing for breast and ovarian cancers if necessary per family or personal history.   Discuss frequency of pap smears and annual exams.  See below for labs and imaging ordered at today's visit  RTC 1 year or sooner prn any gynecologic issues

## 2025-03-17 NOTE — PROGRESS NOTES
Pt here for new pt appt  Pt states no complaints  Pharmacy verified  Good #: [phone]   LMP: a month ago  PAP: today  BC: Pt has Mirena IUD had it placed 5 years ago and would like a replacement  STD Testing: on pap

## 2025-03-17 NOTE — PROGRESS NOTES
ANNUAL GYNECOLOGY VISIT    Chief Complaint  No chief complaint on file.      Subjective  Taylor Patterson is a 24 y.o. female who presents today for Annual Exam.      Preventive Care   Immunization History   Administered Date(s) Administered    Dtap Vaccine 2000, 2000, 2000, 01/02/2002, 09/22/2004    HPV Quadrivalent Vaccine (GARDASIL) - HISTORICAL DATA 12/30/2011, 03/16/2012, 06/28/2012    Hepatitis A Vaccine, Ped/Adol 09/22/2004, 07/05/2005    Hepatitis B Vaccine Adolescent/Pediatric 2000, 2000, 02/26/2001    Hib Vaccine (Prp-d) - HISTORICAL DATA 2000, 2000, 2000, 09/17/2001    IPV 2000, 2000, 06/04/2001, 09/22/2004    Influenza Seasonal Injectable - Historical Data 12/30/2011    Influenza Vaccine H1N1 - HISTORICAL DATA 11/17/2009    Influenza Vaccine Pediatric Split - Historical Data 11/17/2009    Influenza Vaccine Quad Inj (Pf) 12/18/2017, 10/05/2018, 10/14/2019    Influenza Vaccine Quad Nasal 12/08/2014    Influenza, live, trivalent, intranasal 01/08/2016    MENING VAC SERO B 2 DOSE SCHED IM (BEXSERO) 12/18/2017, 01/31/2018    MMR Vaccine 06/04/2001, 09/22/2004    MODERNA SARS-COV-2 VACCINE (12+) 06/23/2021, 07/20/2021    Meningococcal Conjugate Vaccine MCV4 (Menactra) 06/28/2012, 12/18/2017    Tdap Vaccine 05/16/2020    Varicella Vaccine Live 06/04/2001, 12/08/2014     Last Mammogram: NA    Gynecology History and ROS  Current Sexual Activity: Yes  History of sexually transmitted diseases?  no  Abnormal discharge? No  Current Contraception:  Mirena IUD inserted 3/2020    Menstrual History  Patient's last menstrual period was 02/17/2025.  Periods are regular  q 28 days, lasting 3-8 days.   Clots or heavy flow: No  Dysmenorrhea: No  Intermenstrual bleeding/spotting: No  Significant pain with periods:No  Bothersome PMS symptoms: No  Significant Pelvic Pain: No      Pap History  Last pap smear: Never  History of moderate or severe dysplasia:   NA    Cancer Risk Assessement:  Family history of:   - Breast cancer: No   - Ovarian cancer: no   - Uterine cancer: no   - Colon cancer: no    Obstetric History  OB History    Para Term  AB Living   0 0 0 0 0 0   SAB IAB Ectopic Molar Multiple Live Births   0 0 0 0 0 0       Past Medical History  Past Medical History:   Diagnosis Date    Anemia     Anxiety     Depression     Subcutaneous lipoma 2023    abdomen/lower         Past Surgical History  Past Surgical History:   Procedure Laterality Date    LIPOMA EXCISION      ABD Lipoma Removed    DENTAL EXTRACTION(S)  2016    [place] teeth x4       Social History  Social History     Socioeconomic History    Marital status: Single     Spouse name: Not on file    Number of children: Not on file    Years of education: Not on file    Highest education level: Some college, no degree   Occupational History    Not on file   Tobacco Use    Smoking status: Former     Current packs/day: 0.00     Types: Cigarettes    Smokeless tobacco: Never   Vaping Use    Vaping status: Every Day    Substances: Nicotine, THC    Devices: Disposable   Substance and Sexual Activity    Alcohol use: Not Currently     Comment: occ monthly    Drug use: Yes     Frequency: 1.0 times per week     Types: Marijuana     Comment: Marijuana, Acid    Sexual activity: Yes     Partners: Male     Birth control/protection: Condom, I.U.D.   Other Topics Concern    Not on file   Social History Narrative    Not on file     Social Drivers of Health     Financial Resource Strain: Medium Risk (3/10/2025)    Overall Financial Resource Strain (CARDIA)     Difficulty of Paying Living Expenses: Somewhat hard   Food Insecurity: Patient Declined (3/10/2025)    Hunger Vital Sign     Worried About Running Out of Food in the Last Year: Patient declined     Ran Out of Food in the Last Year: Patient declined   Transportation Needs: Unmet Transportation Needs (3/10/2025)    PRAPARE - Transportation     Lack of  Transportation (Medical): Yes     Lack of Transportation (Non-Medical): Yes   Physical Activity: Sufficiently Active (3/10/2025)    Exercise Vital Sign     Days of Exercise per Week: 4 days     Minutes of Exercise per Session: 150+ min   Stress: No Stress Concern Present (3/10/2025)    [employer] - Occupational Stress Questionnaire     Feeling of Stress : Only a little   Social Connections: Unknown (3/10/2025)    Social Connection and Isolation Panel [NHANES]     Frequency of Communication with Friends and Family: Never     Frequency of Social Gatherings with Friends and Family: Patient declined     Attends [employer]ism Services: Never     Active Member of Clubs or Organizations: No     Attends Club or Organization Meetings: Patient declined     Marital Status: Never    Intimate Partner Violence: Not on file   Housing Stability: Unknown (3/10/2025)    Housing Stability Vital Sign     Unable to Pay for Housing in the Last Year: Patient declined     Number of Times Moved in the Last Year: Not on file     Homeless in the Last Year: No       Family History  Family History   Problem Relation Age of Onset    Lupus Mother     No Known Problems Father     No Known Problems Brother     Diabetes Maternal Grandfather     Glaucoma Paternal Grandfather        Home Medications  Current Outpatient Medications on File Prior to Visit   Medication Sig Dispense Refill    levonorgestrel (MIRENA) 20 MCG/DAY IUD 1 Each by Intrauterine route one time. Indications: Birth Control Treatment      DULoxetine (CYMBALTA) 30 MG Cap DR Particles Take 30 mg by mouth every day.      ondansetron (ZOFRAN ODT) 4 MG TABLET DISPERSIBLE Take 1 Tablet by mouth every 8 hours as needed for Nausea/Vomiting. 8 Tablet 0    OMEPRAZOLE PO Take 20 mg by mouth. (Patient not taking: Reported on 3/17/2025)      omeprazole (PRILOSEC) 40 MG delayed-release capsule Take 1 Capsule by mouth every day. (Patient not taking: Reported on  "3/17/2025) 30 Capsule 0     No current facility-administered medications on file prior to visit.       Allergies/Reactions  No Known Allergies    ROS  Positive ROS: none  Gen: no fevers or chills, no significant weight loss or gain, excessive fatigue  Respiratory:  no cough or dyspnea  Cardiac:  no chest pain, no palpitations, no syncope  Breast: no breast discharge, pain, lump or skin changes  GI:  no heartburn, no abdominal pain, no nausea or vomiting  Urinary: no dysuria, urgency, frequency, incontinence   Psych: no depression or anxiety  Neuro: no migraines with aura, fainting spells, numbness or tingling  Extremities: no joint pain, persistently swollen ankles, recurrent leg cramps      Physical Examination:  Vital Signs:   Vitals:    03/17/25 1310   BP: 108/64   BP Location: Right arm   Patient Position: Sitting   BP Cuff Size: Large adult   Weight: 192 lb     Body mass index is 34.01 kg/m².    Constitutional: The patient is well developed and well nourished.  Psychiatric: Patient is oriented to time place and person.   Skin: No rash observed.  Neck: Appears symmetric. Thyroid normal size  Respiratory: normal effort  Breast: Inspection reveals no asymetry or nipple discharge, no skin thickening, dimpling or erythema.  Palpation demonstrates no masses.  Abdomen: Soft, non-tender.  Pelvic Exam:     Vulva: external female genitalia are normal in appearance. No lesions    Urethra - no lesions, no erythema    Vagina: moist, pink, normal ruggae    Cervix: pink, smooth, no lesions, no CMT    Uterus - non-tender, normal size, shape, contour, mobile, anteverted    Ovaries: non-tender, no appreciable masses  Pap Smear performed: Yes  Extremeties: Legs are symmetric and without tenderness. There is no edema present.    Labs/Imaging:  HDL (mg/dL)   Date Value   01/13/2018 52     LDL (mg/dL)   Date Value   01/13/2018 108 (H)     No components found for: \"A1C1\"  WBC (K/uL)   Date Value   04/10/2024 13.9 (H)     Lab Results "   Component Value Date/Time    CO2 20 04/10/2024 1405    BUN 9 04/10/2024 1405           Assessment & Plan  Taylor Patterson is a 24 y.o. female who presents today for Annual Gyn Exam.     Problem List Items Addressed This Visit          Unprioritized    Well woman exam with routine gynecological exam    Discuss concerns: Discuss reasoning for pap smears. Discuss length of use with Mirena IUD - due to be removed in 3/2028.  Encourage patient to continue with PCP. Encourage patient to establish with dermatologist.  Encourage self breast exams. Warning signs discussed.  Discuss genetic testing for breast and ovarian cancers if necessary per family or personal history.   Discuss frequency of pap smears and annual exams.  See below for labs and imaging ordered at today's visit  RTC 1 year or sooner prn any gynecologic issues          Relevant Orders    THINPREP RFLX HPV ASCUS W/CTNG    HIV AG/AB Combo Assay Screening    T.Pallidum AB RAJINDER (Screening)    Hepatitis C Virus Antibody    HEP B Surface Antibody    Hep B Core AB Total    Hep B Surface Antigen     Other Visit Diagnoses         Screening examination for STI        Relevant Orders    HIV AG/AB Combo Assay Screening    T.Pallidum AB RAJINDER (Screening)    Hepatitis C Virus Antibody    HEP B Surface Antibody    Hep B Core AB Total    Hep B Surface Antigen              Return: Annually or PRN    ROMA AlbaNOSVALDO

## 2025-03-18 DIAGNOSIS — Z01.419 WELL WOMAN EXAM WITH ROUTINE GYNECOLOGICAL EXAM: ICD-10-CM

## 2025-03-21 ENCOUNTER — RESULTS FOLLOW-UP (OUTPATIENT)
Dept: OBGYN | Facility: CLINIC | Age: 25
End: 2025-03-21

## 2025-03-21 LAB — THINPREP PAP, CYTOLOGY NL11781: NORMAL

## 2025-04-05 ENCOUNTER — HOSPITAL ENCOUNTER (OUTPATIENT)
Dept: LAB | Facility: MEDICAL CENTER | Age: 25
End: 2025-04-05
Attending: MIDWIFE
Payer: COMMERCIAL

## 2025-04-05 ENCOUNTER — HOSPITAL ENCOUNTER (OUTPATIENT)
Dept: LAB | Facility: MEDICAL CENTER | Age: 25
End: 2025-04-05
Payer: COMMERCIAL

## 2025-04-05 DIAGNOSIS — K62.5 RECTAL BLEEDING: ICD-10-CM

## 2025-04-05 DIAGNOSIS — Z01.419 WELL WOMAN EXAM WITH ROUTINE GYNECOLOGICAL EXAM: ICD-10-CM

## 2025-04-05 DIAGNOSIS — Z11.3 SCREENING EXAMINATION FOR STI: ICD-10-CM

## 2025-04-05 DIAGNOSIS — E66.9 OBESITY (BMI 30-39.9): ICD-10-CM

## 2025-04-05 LAB
BASOPHILS # BLD AUTO: 0.5 % (ref 0–1.8)
BASOPHILS # BLD: 0.03 K/UL (ref 0–0.12)
EOSINOPHIL # BLD AUTO: 0.07 K/UL (ref 0–0.51)
EOSINOPHIL NFR BLD: 1.2 % (ref 0–6.9)
ERYTHROCYTE [DISTWIDTH] IN BLOOD BY AUTOMATED COUNT: 46 FL (ref 35.9–50)
HBV CORE AB SERPL QL IA: NONREACTIVE
HBV SURFACE AB SERPL IA-ACNC: <3.5 MIU/ML (ref 0–10)
HBV SURFACE AG SER QL: NORMAL
HCT VFR BLD AUTO: 45.6 % (ref 37–47)
HCV AB SER QL: NORMAL
HGB BLD-MCNC: 14.8 G/DL (ref 12–16)
HIV 1+2 AB+HIV1 P24 AG SERPL QL IA: NORMAL
IMM GRANULOCYTES # BLD AUTO: 0.02 K/UL (ref 0–0.11)
IMM GRANULOCYTES NFR BLD AUTO: 0.3 % (ref 0–0.9)
LYMPHOCYTES # BLD AUTO: 2.44 K/UL (ref 1–4.8)
LYMPHOCYTES NFR BLD: 41.1 % (ref 22–41)
MCH RBC QN AUTO: 29.7 PG (ref 27–33)
MCHC RBC AUTO-ENTMCNC: 32.5 G/DL (ref 32.2–35.5)
MCV RBC AUTO: 91.4 FL (ref 81.4–97.8)
MONOCYTES # BLD AUTO: 0.44 K/UL (ref 0–0.85)
MONOCYTES NFR BLD AUTO: 7.4 % (ref 0–13.4)
NEUTROPHILS # BLD AUTO: 2.94 K/UL (ref 1.82–7.42)
NEUTROPHILS NFR BLD: 49.5 % (ref 44–72)
NRBC # BLD AUTO: 0 K/UL
NRBC BLD-RTO: 0 /100 WBC (ref 0–0.2)
PLATELET # BLD AUTO: 279 K/UL (ref 164–446)
PMV BLD AUTO: 10 FL (ref 9–12.9)
RBC # BLD AUTO: 4.99 M/UL (ref 4.2–5.4)
T PALLIDUM AB SER QL IA: NORMAL
TSH SERPL DL<=0.005 MIU/L-ACNC: 0.68 UIU/ML (ref 0.38–5.33)
WBC # BLD AUTO: 5.9 K/UL (ref 4.8–10.8)

## 2025-04-05 PROCEDURE — 36415 COLL VENOUS BLD VENIPUNCTURE: CPT

## 2025-04-05 PROCEDURE — 87340 HEPATITIS B SURFACE AG IA: CPT

## 2025-04-05 PROCEDURE — 86780 TREPONEMA PALLIDUM: CPT

## 2025-04-05 PROCEDURE — 85025 COMPLETE CBC W/AUTO DIFF WBC: CPT

## 2025-04-05 PROCEDURE — 87389 HIV-1 AG W/HIV-1&-2 AB AG IA: CPT

## 2025-04-05 PROCEDURE — 86803 HEPATITIS C AB TEST: CPT

## 2025-04-05 PROCEDURE — 84443 ASSAY THYROID STIM HORMONE: CPT

## 2025-04-05 PROCEDURE — 86704 HEP B CORE ANTIBODY TOTAL: CPT

## 2025-04-05 PROCEDURE — 86706 HEP B SURFACE ANTIBODY: CPT

## 2025-04-08 ENCOUNTER — RESULTS FOLLOW-UP (OUTPATIENT)
Dept: MEDICAL GROUP | Facility: CLINIC | Age: 25
End: 2025-04-08